# Patient Record
Sex: FEMALE | Race: WHITE | NOT HISPANIC OR LATINO | URBAN - METROPOLITAN AREA
[De-identification: names, ages, dates, MRNs, and addresses within clinical notes are randomized per-mention and may not be internally consistent; named-entity substitution may affect disease eponyms.]

---

## 2019-10-25 ENCOUNTER — OFFICE VISIT (OUTPATIENT)
Dept: OBSTETRICS AND GYNECOLOGY | Facility: CLINIC | Age: 34
End: 2019-10-25
Payer: COMMERCIAL

## 2019-10-25 VITALS
SYSTOLIC BLOOD PRESSURE: 124 MMHG | BODY MASS INDEX: 26.02 KG/M2 | WEIGHT: 165.81 LBS | DIASTOLIC BLOOD PRESSURE: 76 MMHG | HEIGHT: 67 IN

## 2019-10-25 DIAGNOSIS — Z30.41 SURVEILLANCE OF CONTRACEPTIVE PILL: ICD-10-CM

## 2019-10-25 DIAGNOSIS — Z01.419 WELL WOMAN EXAM WITH ROUTINE GYNECOLOGICAL EXAM: Primary | ICD-10-CM

## 2019-10-25 PROCEDURE — 99385 PREV VISIT NEW AGE 18-39: CPT | Mod: S$GLB,,, | Performed by: OBSTETRICS & GYNECOLOGY

## 2019-10-25 PROCEDURE — 99385 PR PREVENTIVE VISIT,NEW,18-39: ICD-10-PCS | Mod: S$GLB,,, | Performed by: OBSTETRICS & GYNECOLOGY

## 2019-10-25 PROCEDURE — 99999 PR PBB SHADOW E&M-NEW PATIENT-LVL III: ICD-10-PCS | Mod: PBBFAC,,, | Performed by: OBSTETRICS & GYNECOLOGY

## 2019-10-25 PROCEDURE — 87624 HPV HI-RISK TYP POOLED RSLT: CPT

## 2019-10-25 PROCEDURE — 88175 CYTOPATH C/V AUTO FLUID REDO: CPT

## 2019-10-25 PROCEDURE — 99999 PR PBB SHADOW E&M-NEW PATIENT-LVL III: CPT | Mod: PBBFAC,,, | Performed by: OBSTETRICS & GYNECOLOGY

## 2019-10-25 RX ORDER — LEVONORGESTREL AND ETHINYL ESTRADIOL 0.1-0.02MG
1 KIT ORAL DAILY
COMMUNITY
End: 2019-10-25

## 2019-10-25 RX ORDER — DROSPIRENONE AND ETHINYL ESTRADIOL 0.02-3(28)
1 KIT ORAL DAILY
Qty: 28 TABLET | Refills: 12 | Status: SHIPPED | OUTPATIENT
Start: 2019-10-25 | End: 2020-09-09

## 2019-10-25 NOTE — PROGRESS NOTES
History & Physical  Gynecology      SUBJECTIVE:     Chief Complaint: Annual Exam       History of Present Illness:  Annual Exam-Premenopausal  Patient presents for annual exam. The patient has no complaints today. Menstrual cycles are monthly with ocp.  The patient is sexually active. GYN screening history: last pap: approximate date  and was normal. The patient participates in regular exercise: no.  Smoking status:  no    Contraception: ocp    FH:  Breast cancer: paternal aunt  Colon cancer: neg  Ovarian cancer: neg    Review of patient's allergies indicates:  No Known Allergies    Past Medical History:   Diagnosis Date    Abnormal Pap smear of cervix      History reviewed. No pertinent surgical history.  OB History    None       Family History   Problem Relation Age of Onset    Breast cancer Paternal Aunt     Colon cancer Neg Hx     Cancer Neg Hx     Diabetes Neg Hx     Eclampsia Neg Hx     Hypertension Neg Hx     Miscarriages / Stillbirths Neg Hx     Ovarian cancer Neg Hx      labor Neg Hx     Stroke Neg Hx      Social History     Tobacco Use    Smoking status: Never Smoker    Smokeless tobacco: Never Used   Substance Use Topics    Alcohol use: Yes    Drug use: Never       Current Outpatient Medications   Medication Sig    drospirenone-ethinyl estradiol (KELSIE) 3-0.02 mg per tablet Take 1 tablet by mouth once daily.     No current facility-administered medications for this visit.          Review of Systems:  Review of Systems   Constitutional: Negative for activity change, appetite change and fever.   Respiratory: Negative for shortness of breath.    Cardiovascular: Negative for chest pain.   Gastrointestinal: Negative for abdominal pain, constipation, diarrhea, nausea and vomiting.   Genitourinary: Negative for menorrhagia, menstrual problem, pelvic pain, vaginal bleeding, vaginal discharge and vaginal pain.   Integumentary:  Negative for nipple discharge.   Neurological: Negative for  numbness and headaches.   Breast: Negative for mastodynia and nipple discharge       OBJECTIVE:     Physical Exam:  Physical Exam   Constitutional: She is oriented to person, place, and time. She appears well-developed and well-nourished.   Neck: Normal range of motion. Neck supple. No tracheal deviation present. No thyromegaly present.   Cardiovascular: Normal rate, regular rhythm and normal heart sounds.   Pulmonary/Chest: Effort normal and breath sounds normal. Right breast exhibits no inverted nipple, no mass, no nipple discharge, no skin change and no tenderness. Left breast exhibits no inverted nipple, no mass, no nipple discharge, no skin change and no tenderness. Breasts are symmetrical.   Abdominal: Soft.   Genitourinary: Vagina normal and uterus normal. No labial fusion. There is no rash, tenderness, lesion or injury on the right labia. There is no rash, tenderness, lesion or injury on the left labia. Uterus is not deviated, not enlarged, not fixed and not tender. Cervix exhibits no motion tenderness, no discharge and no friability. Right adnexum displays no mass, no tenderness and no fullness. Left adnexum displays no mass, no tenderness and no fullness. No erythema, tenderness or bleeding in the vagina. No foreign body in the vagina. No signs of injury around the vagina. No vaginal discharge found.   Genitourinary Comments: Urethra: normal appearing urethra with no masses, tenderness or lesions  Urethral meatus: normal size, anterior vaginal wall with no prolapse, no lesions   Neurological: She is alert and oriented to person, place, and time.   Psychiatric: She has a normal mood and affect. Her behavior is normal. Judgment and thought content normal.   Nursing note and vitals reviewed.      Chaperoned by: Juan Manuel    ASSESSMENT:       ICD-10-CM ICD-9-CM    1. Well woman exam with routine gynecological exam Z01.419 V72.31 Liquid-based pap smear, screening      HPV High Risk Genotypes, PCR   2. Surveillance of  contraceptive pill Z30.41 V25.41 drospirenone-ethinyl estradiol (KELSIE) 3-0.02 mg per tablet          Plan:      Arianna was seen today for annual exam.    Diagnoses and all orders for this visit:    Well woman exam with routine gynecological exam  -     Liquid-based pap smear, screening  -     HPV High Risk Genotypes, PCR    Surveillance of contraceptive pill  -     drospirenone-ethinyl estradiol (KELSIE) 3-0.02 mg per tablet; Take 1 tablet by mouth once daily.        Orders Placed This Encounter   Procedures    HPV High Risk Genotypes, PCR       Well Woman:  - Pap smear and hpv today  - Birth control: refilled  - GC/CT:n/a  - Mammogram: due age 42  - Smoking cessation: n/a  - Labs: none required   - Vaccines: none required  - Exercise counseling        Follow up in one year for annual or prn.    Padma Godoy

## 2019-10-30 LAB
HPV HR 12 DNA CVX QL NAA+PROBE: NEGATIVE
HPV16 AG SPEC QL: NEGATIVE
HPV18 DNA SPEC QL NAA+PROBE: NEGATIVE

## 2020-09-15 ENCOUNTER — CLINICAL SUPPORT (OUTPATIENT)
Dept: URGENT CARE | Facility: CLINIC | Age: 35
End: 2020-09-15
Payer: COMMERCIAL

## 2020-09-15 VITALS — HEART RATE: 83 BPM | OXYGEN SATURATION: 98 % | TEMPERATURE: 98 F

## 2020-09-15 DIAGNOSIS — Z11.9 ENCOUNTER FOR SCREENING EXAMINATION FOR INFECTIOUS DISEASE: Primary | ICD-10-CM

## 2020-09-15 LAB
CTP QC/QA: YES
SARS-COV-2 RDRP RESP QL NAA+PROBE: NEGATIVE

## 2020-09-15 PROCEDURE — U0002 COVID-19 LAB TEST NON-CDC: HCPCS | Mod: S$GLB,,, | Performed by: NURSE PRACTITIONER

## 2020-09-15 PROCEDURE — 99211 OFF/OP EST MAY X REQ PHY/QHP: CPT | Mod: S$GLB,CS,, | Performed by: NURSE PRACTITIONER

## 2020-09-15 PROCEDURE — U0002: ICD-10-PCS | Mod: S$GLB,,, | Performed by: NURSE PRACTITIONER

## 2020-09-15 PROCEDURE — 99211 PR OFFICE/OUTPT VISIT, EST, LEVL I: ICD-10-PCS | Mod: S$GLB,CS,, | Performed by: NURSE PRACTITIONER

## 2020-09-16 NOTE — PROGRESS NOTES
Subjective:       Patient ID: Arianna Kraft is a 35 y.o. female.    Chief Complaint: No chief complaint on file.    Essential covid test completed by IRENE SILVESTRE     Objective:      Physical Exam    Assessment:       1. Encounter for screening examination for infectious disease        Plan:                   No follow-ups on file.

## 2020-12-20 ENCOUNTER — CLINICAL SUPPORT (OUTPATIENT)
Dept: URGENT CARE | Facility: CLINIC | Age: 35
End: 2020-12-20
Payer: COMMERCIAL

## 2020-12-20 DIAGNOSIS — Z11.9 SCREENING EXAMINATION FOR UNSPECIFIED INFECTIOUS DISEASE: Primary | ICD-10-CM

## 2020-12-20 LAB
CTP QC/QA: YES
SARS-COV-2 RDRP RESP QL NAA+PROBE: NEGATIVE

## 2020-12-20 PROCEDURE — U0002 COVID-19 LAB TEST NON-CDC: HCPCS | Mod: QW,S$GLB,, | Performed by: FAMILY MEDICINE

## 2020-12-20 PROCEDURE — U0002: ICD-10-PCS | Mod: QW,S$GLB,, | Performed by: FAMILY MEDICINE

## 2020-12-20 PROCEDURE — 99211 OFF/OP EST MAY X REQ PHY/QHP: CPT | Mod: S$GLB,CS,, | Performed by: FAMILY MEDICINE

## 2020-12-20 PROCEDURE — 99211 PR OFFICE/OUTPT VISIT, EST, LEVL I: ICD-10-PCS | Mod: S$GLB,CS,, | Performed by: FAMILY MEDICINE

## 2021-04-16 ENCOUNTER — PATIENT MESSAGE (OUTPATIENT)
Dept: RESEARCH | Facility: HOSPITAL | Age: 36
End: 2021-04-16

## 2021-06-14 ENCOUNTER — CLINICAL SUPPORT (OUTPATIENT)
Dept: URGENT CARE | Facility: CLINIC | Age: 36
End: 2021-06-14
Payer: COMMERCIAL

## 2021-06-14 DIAGNOSIS — Z11.9 SCREENING EXAMINATION FOR UNSPECIFIED INFECTIOUS DISEASE: Primary | ICD-10-CM

## 2021-06-14 LAB
CTP QC/QA: YES
SARS-COV-2 RDRP RESP QL NAA+PROBE: NEGATIVE

## 2021-06-14 PROCEDURE — U0002: ICD-10-PCS | Mod: QW,S$GLB,, | Performed by: SURGERY

## 2021-06-14 PROCEDURE — U0002 COVID-19 LAB TEST NON-CDC: HCPCS | Mod: QW,S$GLB,, | Performed by: SURGERY

## 2021-11-17 ENCOUNTER — OFFICE VISIT (OUTPATIENT)
Dept: OBSTETRICS AND GYNECOLOGY | Facility: CLINIC | Age: 36
End: 2021-11-17
Payer: COMMERCIAL

## 2021-11-17 VITALS
SYSTOLIC BLOOD PRESSURE: 100 MMHG | DIASTOLIC BLOOD PRESSURE: 60 MMHG | WEIGHT: 176.38 LBS | HEIGHT: 67 IN | BODY MASS INDEX: 27.68 KG/M2

## 2021-11-17 DIAGNOSIS — Z11.3 SCREENING EXAMINATION FOR STD (SEXUALLY TRANSMITTED DISEASE): ICD-10-CM

## 2021-11-17 DIAGNOSIS — Z01.419 ENCOUNTER FOR ANNUAL ROUTINE GYNECOLOGICAL EXAMINATION: Primary | ICD-10-CM

## 2021-11-17 PROCEDURE — 99999 PR PBB SHADOW E&M-EST. PATIENT-LVL II: CPT | Mod: PBBFAC,,, | Performed by: OBSTETRICS & GYNECOLOGY

## 2021-11-17 PROCEDURE — 87591 N.GONORRHOEAE DNA AMP PROB: CPT | Performed by: OBSTETRICS & GYNECOLOGY

## 2021-11-17 PROCEDURE — 3074F SYST BP LT 130 MM HG: CPT | Mod: CPTII,S$GLB,, | Performed by: OBSTETRICS & GYNECOLOGY

## 2021-11-17 PROCEDURE — 3008F BODY MASS INDEX DOCD: CPT | Mod: CPTII,S$GLB,, | Performed by: OBSTETRICS & GYNECOLOGY

## 2021-11-17 PROCEDURE — 1160F PR REVIEW ALL MEDS BY PRESCRIBER/CLIN PHARMACIST DOCUMENTED: ICD-10-PCS | Mod: CPTII,S$GLB,, | Performed by: OBSTETRICS & GYNECOLOGY

## 2021-11-17 PROCEDURE — 3074F PR MOST RECENT SYSTOLIC BLOOD PRESSURE < 130 MM HG: ICD-10-PCS | Mod: CPTII,S$GLB,, | Performed by: OBSTETRICS & GYNECOLOGY

## 2021-11-17 PROCEDURE — 99395 PREV VISIT EST AGE 18-39: CPT | Mod: S$GLB,,, | Performed by: OBSTETRICS & GYNECOLOGY

## 2021-11-17 PROCEDURE — 1159F PR MEDICATION LIST DOCUMENTED IN MEDICAL RECORD: ICD-10-PCS | Mod: CPTII,S$GLB,, | Performed by: OBSTETRICS & GYNECOLOGY

## 2021-11-17 PROCEDURE — 3008F PR BODY MASS INDEX (BMI) DOCUMENTED: ICD-10-PCS | Mod: CPTII,S$GLB,, | Performed by: OBSTETRICS & GYNECOLOGY

## 2021-11-17 PROCEDURE — 1160F RVW MEDS BY RX/DR IN RCRD: CPT | Mod: CPTII,S$GLB,, | Performed by: OBSTETRICS & GYNECOLOGY

## 2021-11-17 PROCEDURE — 87491 CHLMYD TRACH DNA AMP PROBE: CPT | Performed by: OBSTETRICS & GYNECOLOGY

## 2021-11-17 PROCEDURE — 3078F DIAST BP <80 MM HG: CPT | Mod: CPTII,S$GLB,, | Performed by: OBSTETRICS & GYNECOLOGY

## 2021-11-17 PROCEDURE — 99999 PR PBB SHADOW E&M-EST. PATIENT-LVL II: ICD-10-PCS | Mod: PBBFAC,,, | Performed by: OBSTETRICS & GYNECOLOGY

## 2021-11-17 PROCEDURE — 1159F MED LIST DOCD IN RCRD: CPT | Mod: CPTII,S$GLB,, | Performed by: OBSTETRICS & GYNECOLOGY

## 2021-11-17 PROCEDURE — 3078F PR MOST RECENT DIASTOLIC BLOOD PRESSURE < 80 MM HG: ICD-10-PCS | Mod: CPTII,S$GLB,, | Performed by: OBSTETRICS & GYNECOLOGY

## 2021-11-17 PROCEDURE — 99395 PR PREVENTIVE VISIT,EST,18-39: ICD-10-PCS | Mod: S$GLB,,, | Performed by: OBSTETRICS & GYNECOLOGY

## 2021-11-19 LAB
C TRACH DNA SPEC QL NAA+PROBE: NOT DETECTED
N GONORRHOEA DNA SPEC QL NAA+PROBE: NOT DETECTED

## 2023-12-01 ENCOUNTER — LAB VISIT (OUTPATIENT)
Dept: LAB | Facility: OTHER | Age: 38
End: 2023-12-01
Attending: STUDENT IN AN ORGANIZED HEALTH CARE EDUCATION/TRAINING PROGRAM
Payer: COMMERCIAL

## 2023-12-01 ENCOUNTER — OFFICE VISIT (OUTPATIENT)
Dept: OBSTETRICS AND GYNECOLOGY | Facility: CLINIC | Age: 38
End: 2023-12-01
Attending: STUDENT IN AN ORGANIZED HEALTH CARE EDUCATION/TRAINING PROGRAM
Payer: COMMERCIAL

## 2023-12-01 VITALS
WEIGHT: 183.56 LBS | SYSTOLIC BLOOD PRESSURE: 156 MMHG | DIASTOLIC BLOOD PRESSURE: 72 MMHG | BODY MASS INDEX: 28.75 KG/M2

## 2023-12-01 DIAGNOSIS — N91.2 AMENORRHEA: Primary | ICD-10-CM

## 2023-12-01 DIAGNOSIS — Z12.4 CERVICAL CANCER SCREENING: ICD-10-CM

## 2023-12-01 DIAGNOSIS — Z11.51 ENCOUNTER FOR SCREENING FOR HUMAN PAPILLOMAVIRUS (HPV): ICD-10-CM

## 2023-12-01 DIAGNOSIS — N91.2 AMENORRHEA: ICD-10-CM

## 2023-12-01 LAB
ABO + RH BLD: NORMAL
B-HCG UR QL: POSITIVE
BASOPHILS # BLD AUTO: 0.06 K/UL (ref 0–0.2)
BASOPHILS NFR BLD: 0.7 % (ref 0–1.9)
BLD GP AB SCN CELLS X3 SERPL QL: NORMAL
CTP QC/QA: YES
DIFFERENTIAL METHOD: ABNORMAL
EOSINOPHIL # BLD AUTO: 0.1 K/UL (ref 0–0.5)
EOSINOPHIL NFR BLD: 0.8 % (ref 0–8)
ERYTHROCYTE [DISTWIDTH] IN BLOOD BY AUTOMATED COUNT: 12.3 % (ref 11.5–14.5)
HBV SURFACE AG SERPL QL IA: NORMAL
HCT VFR BLD AUTO: 39.1 % (ref 37–48.5)
HCV AB SERPL QL IA: NORMAL
HGB BLD-MCNC: 13.2 G/DL (ref 12–16)
HIV 1+2 AB+HIV1 P24 AG SERPL QL IA: NORMAL
IMM GRANULOCYTES # BLD AUTO: 0.06 K/UL (ref 0–0.04)
IMM GRANULOCYTES NFR BLD AUTO: 0.7 % (ref 0–0.5)
LYMPHOCYTES # BLD AUTO: 2.1 K/UL (ref 1–4.8)
LYMPHOCYTES NFR BLD: 22.6 % (ref 18–48)
MCH RBC QN AUTO: 29.9 PG (ref 27–31)
MCHC RBC AUTO-ENTMCNC: 33.8 G/DL (ref 32–36)
MCV RBC AUTO: 89 FL (ref 82–98)
MONOCYTES # BLD AUTO: 0.8 K/UL (ref 0.3–1)
MONOCYTES NFR BLD: 8.2 % (ref 4–15)
NEUTROPHILS # BLD AUTO: 6.2 K/UL (ref 1.8–7.7)
NEUTROPHILS NFR BLD: 67 % (ref 38–73)
NRBC BLD-RTO: 0 /100 WBC
PLATELET # BLD AUTO: 349 K/UL (ref 150–450)
PMV BLD AUTO: 9.5 FL (ref 9.2–12.9)
RBC # BLD AUTO: 4.41 M/UL (ref 4–5.4)
SPECIMEN OUTDATE: NORMAL
WBC # BLD AUTO: 9.22 K/UL (ref 3.9–12.7)

## 2023-12-01 PROCEDURE — 99213 OFFICE O/P EST LOW 20 MIN: CPT | Mod: S$GLB,,, | Performed by: STUDENT IN AN ORGANIZED HEALTH CARE EDUCATION/TRAINING PROGRAM

## 2023-12-01 PROCEDURE — 81025 POCT URINE PREGNANCY: ICD-10-PCS | Mod: S$GLB,,, | Performed by: STUDENT IN AN ORGANIZED HEALTH CARE EDUCATION/TRAINING PROGRAM

## 2023-12-01 PROCEDURE — 3078F PR MOST RECENT DIASTOLIC BLOOD PRESSURE < 80 MM HG: ICD-10-PCS | Mod: CPTII,S$GLB,, | Performed by: STUDENT IN AN ORGANIZED HEALTH CARE EDUCATION/TRAINING PROGRAM

## 2023-12-01 PROCEDURE — 86901 BLOOD TYPING SEROLOGIC RH(D): CPT | Performed by: STUDENT IN AN ORGANIZED HEALTH CARE EDUCATION/TRAINING PROGRAM

## 2023-12-01 PROCEDURE — 85025 COMPLETE CBC W/AUTO DIFF WBC: CPT | Performed by: STUDENT IN AN ORGANIZED HEALTH CARE EDUCATION/TRAINING PROGRAM

## 2023-12-01 PROCEDURE — 36415 COLL VENOUS BLD VENIPUNCTURE: CPT | Performed by: STUDENT IN AN ORGANIZED HEALTH CARE EDUCATION/TRAINING PROGRAM

## 2023-12-01 PROCEDURE — 86592 SYPHILIS TEST NON-TREP QUAL: CPT | Performed by: STUDENT IN AN ORGANIZED HEALTH CARE EDUCATION/TRAINING PROGRAM

## 2023-12-01 PROCEDURE — 87389 HIV-1 AG W/HIV-1&-2 AB AG IA: CPT | Performed by: STUDENT IN AN ORGANIZED HEALTH CARE EDUCATION/TRAINING PROGRAM

## 2023-12-01 PROCEDURE — 3077F SYST BP >= 140 MM HG: CPT | Mod: CPTII,S$GLB,, | Performed by: STUDENT IN AN ORGANIZED HEALTH CARE EDUCATION/TRAINING PROGRAM

## 2023-12-01 PROCEDURE — 87340 HEPATITIS B SURFACE AG IA: CPT | Performed by: STUDENT IN AN ORGANIZED HEALTH CARE EDUCATION/TRAINING PROGRAM

## 2023-12-01 PROCEDURE — 3077F PR MOST RECENT SYSTOLIC BLOOD PRESSURE >= 140 MM HG: ICD-10-PCS | Mod: CPTII,S$GLB,, | Performed by: STUDENT IN AN ORGANIZED HEALTH CARE EDUCATION/TRAINING PROGRAM

## 2023-12-01 PROCEDURE — 87086 URINE CULTURE/COLONY COUNT: CPT | Performed by: STUDENT IN AN ORGANIZED HEALTH CARE EDUCATION/TRAINING PROGRAM

## 2023-12-01 PROCEDURE — 3078F DIAST BP <80 MM HG: CPT | Mod: CPTII,S$GLB,, | Performed by: STUDENT IN AN ORGANIZED HEALTH CARE EDUCATION/TRAINING PROGRAM

## 2023-12-01 PROCEDURE — 1160F PR REVIEW ALL MEDS BY PRESCRIBER/CLIN PHARMACIST DOCUMENTED: ICD-10-PCS | Mod: CPTII,S$GLB,, | Performed by: STUDENT IN AN ORGANIZED HEALTH CARE EDUCATION/TRAINING PROGRAM

## 2023-12-01 PROCEDURE — 1159F PR MEDICATION LIST DOCUMENTED IN MEDICAL RECORD: ICD-10-PCS | Mod: CPTII,S$GLB,, | Performed by: STUDENT IN AN ORGANIZED HEALTH CARE EDUCATION/TRAINING PROGRAM

## 2023-12-01 PROCEDURE — 88175 CYTOPATH C/V AUTO FLUID REDO: CPT | Performed by: STUDENT IN AN ORGANIZED HEALTH CARE EDUCATION/TRAINING PROGRAM

## 2023-12-01 PROCEDURE — 81025 URINE PREGNANCY TEST: CPT | Mod: S$GLB,,, | Performed by: STUDENT IN AN ORGANIZED HEALTH CARE EDUCATION/TRAINING PROGRAM

## 2023-12-01 PROCEDURE — 3008F BODY MASS INDEX DOCD: CPT | Mod: CPTII,S$GLB,, | Performed by: STUDENT IN AN ORGANIZED HEALTH CARE EDUCATION/TRAINING PROGRAM

## 2023-12-01 PROCEDURE — 99999 PR PBB SHADOW E&M-EST. PATIENT-LVL III: CPT | Mod: PBBFAC,,, | Performed by: STUDENT IN AN ORGANIZED HEALTH CARE EDUCATION/TRAINING PROGRAM

## 2023-12-01 PROCEDURE — 99999 PR PBB SHADOW E&M-EST. PATIENT-LVL III: ICD-10-PCS | Mod: PBBFAC,,, | Performed by: STUDENT IN AN ORGANIZED HEALTH CARE EDUCATION/TRAINING PROGRAM

## 2023-12-01 PROCEDURE — 86803 HEPATITIS C AB TEST: CPT | Performed by: STUDENT IN AN ORGANIZED HEALTH CARE EDUCATION/TRAINING PROGRAM

## 2023-12-01 PROCEDURE — 99213 PR OFFICE/OUTPT VISIT, EST, LEVL III, 20-29 MIN: ICD-10-PCS | Mod: S$GLB,,, | Performed by: STUDENT IN AN ORGANIZED HEALTH CARE EDUCATION/TRAINING PROGRAM

## 2023-12-01 PROCEDURE — 1160F RVW MEDS BY RX/DR IN RCRD: CPT | Mod: CPTII,S$GLB,, | Performed by: STUDENT IN AN ORGANIZED HEALTH CARE EDUCATION/TRAINING PROGRAM

## 2023-12-01 PROCEDURE — 1159F MED LIST DOCD IN RCRD: CPT | Mod: CPTII,S$GLB,, | Performed by: STUDENT IN AN ORGANIZED HEALTH CARE EDUCATION/TRAINING PROGRAM

## 2023-12-01 PROCEDURE — 87624 HPV HI-RISK TYP POOLED RSLT: CPT | Performed by: STUDENT IN AN ORGANIZED HEALTH CARE EDUCATION/TRAINING PROGRAM

## 2023-12-01 PROCEDURE — 86762 RUBELLA ANTIBODY: CPT | Performed by: STUDENT IN AN ORGANIZED HEALTH CARE EDUCATION/TRAINING PROGRAM

## 2023-12-01 PROCEDURE — 3008F PR BODY MASS INDEX (BMI) DOCUMENTED: ICD-10-PCS | Mod: CPTII,S$GLB,, | Performed by: STUDENT IN AN ORGANIZED HEALTH CARE EDUCATION/TRAINING PROGRAM

## 2023-12-01 PROCEDURE — 87491 CHLMYD TRACH DNA AMP PROBE: CPT | Performed by: STUDENT IN AN ORGANIZED HEALTH CARE EDUCATION/TRAINING PROGRAM

## 2023-12-01 NOTE — PROGRESS NOTES
Chief Complaint: Absence of Menses     HPI:      Arianna Kraft is a 38 y.o.  who presents complaining of absence of menses.  She reports some nausea, feeling hot, and mild back pains. Denies vomiting, bleeding, discharge. LMP 10/21/23.    Pregnancy History:       GYNhx:  History of HPV on pap, normal since  Denies std/HSV history    Meds: PNV    Past Medical History:   Diagnosis Date    Abnormal Pap smear of cervix      History reviewed. No pertinent surgical history.  Social History     Tobacco Use    Smoking status: Never    Smokeless tobacco: Never   Substance Use Topics    Alcohol use: Yes     Comment: socially    Drug use: Never     Family History   Problem Relation Age of Onset    Breast cancer Paternal Aunt     Colon cancer Neg Hx     Cancer Neg Hx     Diabetes Neg Hx     Eclampsia Neg Hx     Hypertension Neg Hx     Miscarriages / Stillbirths Neg Hx     Ovarian cancer Neg Hx      labor Neg Hx     Stroke Neg Hx      OB History    Para Term  AB Living   0 0 0 0 0 0   SAB IAB Ectopic Multiple Live Births   0 0 0 0 0   Obstetric Comments   Abnormal pap with HPV and negative since        Physical Exam:      PHYSICAL EXAM:  BP (!) 156/72   Wt 83.3 kg (183 lb 8.5 oz)   BMI 28.75 kg/m²   Body mass index is 28.75 kg/m².     APPEARANCE: Well nourished, well developed, in no acute distress.    Assessment/Plan:     Arianna was seen today for possible pregnancy.    Diagnoses and all orders for this visit:    Amenorrhea  -     POCT Urine Pregnancy  -     Hepatitis C Antibody; Future  -     HIV-1 and HIV-2 antibodies; Future  -     RPR; Future  -     Hepatitis B surface antigen; Future  -     Type & Screen; Future  -     Rubella antibody, IgG; Future  -     Urine culture  -     CBC auto differential; Future  -     C. trachomatis/N. gonorrhoeae by AMP DNA  -      OB/GYN Procedure ([facility]) - Extended List - Future; Future    Cervical cancer screening  -     Liquid-Based Pap Smear,  Screening    Encounter for screening for human papillomavirus (HPV)  -     HPV High Risk Genotypes, PCR    - EGA 5w6d by LMP  - prenatal labs today  - counseling as below, pregnancy packet provided  - s/p flu and covid boosters  - pap/hpv updated today  - briefly discussed genetic screening options, she desires [id number], pamphlet given to call and assess cost  - discussed ASA 81 mg daily at 12 wks  - initial OB visit and dating scan in 2 weeks  - HTN today, none noted on chart review, patient believes it is due to nerves, will check in again at initial OB visit  - pt of Dr Abrams    Counselin. Patient was counseled today on proper weight gain based on the [place] [employer] [facility]'s recommendations based on her pre-pregnancy weight.   2. Discussed dietary recommendations.  3. Encouraged compliance with prenatal vitamins.  4. Optional genetic testing discussed.   5. Safety of exercise discussed with patient, and continued active lifestyle encouraged.  6. COVID-19 virus precautions for both patient and her spouse discussed, and available data on COVID vaccination reviewed.  7. Normal course of prenatal care reviewed.  8. Medications safe in pregnancy discussed and list provided.    30 minutes of face-to-face discussion occurred during today's visit.     Use of the [facility] Patient Portal discussed and encouraged during today's visit.     Effie Tong MD  Obstetrics and Gynecology  [facility]

## 2023-12-02 LAB — BACTERIA UR CULT: NORMAL

## 2023-12-04 LAB
C TRACH DNA SPEC QL NAA+PROBE: NOT DETECTED
N GONORRHOEA DNA SPEC QL NAA+PROBE: NOT DETECTED
RPR SER QL: NORMAL
RUBV IGG SER-ACNC: 16.6 IU/ML
RUBV IGG SER-IMP: REACTIVE

## 2023-12-06 ENCOUNTER — TELEPHONE (OUTPATIENT)
Dept: OBSTETRICS AND GYNECOLOGY | Facility: CLINIC | Age: 38
End: 2023-12-06
Payer: COMMERCIAL

## 2023-12-06 LAB
HPV HR 12 DNA SPEC QL NAA+PROBE: NEGATIVE
HPV16 AG SPEC QL: NEGATIVE
HPV18 DNA SPEC QL NAA+PROBE: NEGATIVE

## 2023-12-06 RX ORDER — ONDANSETRON 4 MG/1
4 TABLET, ORALLY DISINTEGRATING ORAL EVERY 8 HOURS PRN
Qty: 30 TABLET | Refills: 0 | Status: SHIPPED | OUTPATIENT
Start: 2023-12-06 | End: 2024-01-04

## 2023-12-06 RX ORDER — ONDANSETRON 4 MG/1
4 TABLET, FILM COATED ORAL EVERY 8 HOURS PRN
Qty: 30 TABLET | Refills: 0 | Status: SHIPPED | OUTPATIENT
Start: 2023-12-06 | End: 2024-12-05

## 2023-12-06 NOTE — TELEPHONE ENCOUNTER
OB states she has been nauseated for a while but last night started vomiting.  Tolerating water but not tolerating food today.  Taking vitamin B6/Unisom for several days which had been working well until today. ED precautions discussed.  Also suggested mela and cold sugary drinks like Sprite.     Zofran pended

## 2023-12-11 LAB
FINAL PATHOLOGIC DIAGNOSIS: NORMAL
Lab: NORMAL

## 2023-12-12 RX ORDER — TERCONAZOLE 8 MG/G
1 CREAM VAGINAL NIGHTLY
Qty: 20 G | Refills: 1 | Status: SHIPPED | OUTPATIENT
Start: 2023-12-12

## 2023-12-15 ENCOUNTER — INITIAL PRENATAL (OUTPATIENT)
Dept: OBSTETRICS AND GYNECOLOGY | Facility: CLINIC | Age: 38
End: 2023-12-15
Payer: COMMERCIAL

## 2023-12-15 ENCOUNTER — PROCEDURE VISIT (OUTPATIENT)
Dept: OBSTETRICS AND GYNECOLOGY | Facility: CLINIC | Age: 38
End: 2023-12-15
Payer: COMMERCIAL

## 2023-12-15 VITALS — BODY MASS INDEX: 28.66 KG/M2 | WEIGHT: 183 LBS | SYSTOLIC BLOOD PRESSURE: 112 MMHG | DIASTOLIC BLOOD PRESSURE: 70 MMHG

## 2023-12-15 DIAGNOSIS — N91.2 AMENORRHEA: ICD-10-CM

## 2023-12-15 DIAGNOSIS — Z3A.01 7 WEEKS GESTATION OF PREGNANCY: Primary | ICD-10-CM

## 2023-12-15 DIAGNOSIS — Z36.89 ENCOUNTER FOR FETAL ANATOMIC SURVEY: ICD-10-CM

## 2023-12-15 DIAGNOSIS — R11.2 NAUSEA AND VOMITING, UNSPECIFIED VOMITING TYPE: ICD-10-CM

## 2023-12-15 PROCEDURE — 0502F PR SUBSEQUENT PRENATAL CARE: ICD-10-PCS | Mod: CPTII,S$GLB,, | Performed by: NURSE PRACTITIONER

## 2023-12-15 PROCEDURE — 76801 US OB/GYN EXTENDED PROCEDURE (VIEWPOINT): ICD-10-PCS | Mod: S$GLB,,, | Performed by: OBSTETRICS & GYNECOLOGY

## 2023-12-15 PROCEDURE — 99999 PR PBB SHADOW E&M-EST. PATIENT-LVL III: CPT | Mod: PBBFAC,,, | Performed by: NURSE PRACTITIONER

## 2023-12-15 PROCEDURE — 76801 OB US < 14 WKS SINGLE FETUS: CPT | Mod: S$GLB,,, | Performed by: OBSTETRICS & GYNECOLOGY

## 2023-12-15 PROCEDURE — 99999 PR PBB SHADOW E&M-EST. PATIENT-LVL III: ICD-10-PCS | Mod: PBBFAC,,, | Performed by: NURSE PRACTITIONER

## 2023-12-15 PROCEDURE — 0502F SUBSEQUENT PRENATAL CARE: CPT | Mod: CPTII,S$GLB,, | Performed by: NURSE PRACTITIONER

## 2023-12-15 RX ORDER — DOXYLAMINE SUCCINATE AND PYRIDOXINE HYDROCHLORIDE, DELAYED RELEASE TABLETS 10 MG/10 MG 10; 10 MG/1; MG/1
2 TABLET, DELAYED RELEASE ORAL NIGHTLY
Qty: 60 TABLET | Refills: 11 | Status: ACTIVE | OUTPATIENT
Start: 2023-12-15 | End: 2023-12-26

## 2023-12-15 NOTE — PROGRESS NOTES
Presents for initial visit with partner. Dating US reviewed. Significant nausea/vomiting, Zofran helping little. [facility]. Discussed interventions. Constipation, Colace BID, can take Dulcolax today. Plan for MT21 at next visit. Anatomy scan ordered. FU in 4 weeks for VICTORIA

## 2023-12-25 DIAGNOSIS — R11.2 NAUSEA AND VOMITING, UNSPECIFIED VOMITING TYPE: ICD-10-CM

## 2023-12-26 RX ORDER — DOXYLAMINE SUCCINATE AND PYRIDOXINE HYDROCHLORIDE, DELAYED RELEASE TABLETS 10 MG/10 MG 10; 10 MG/1; MG/1
2 TABLET, DELAYED RELEASE ORAL NIGHTLY
Qty: 60 TABLET | Refills: 11 | Status: SHIPPED | OUTPATIENT
Start: 2023-12-26 | End: 2024-12-25

## 2024-01-02 ENCOUNTER — TELEPHONE (OUTPATIENT)
Dept: OBSTETRICS AND GYNECOLOGY | Facility: CLINIC | Age: 39
End: 2024-01-02
Payer: COMMERCIAL

## 2024-01-02 RX ORDER — ONDANSETRON 4 MG/1
4 TABLET, ORALLY DISINTEGRATING ORAL
Status: DISCONTINUED | OUTPATIENT
Start: 2024-01-02 | End: 2024-02-09

## 2024-01-04 RX ORDER — ONDANSETRON 4 MG/1
TABLET, ORALLY DISINTEGRATING ORAL
Qty: 30 TABLET | Refills: 0 | Status: SHIPPED | OUTPATIENT
Start: 2024-01-04

## 2024-01-04 NOTE — TELEPHONE ENCOUNTER
Refill Routing Note   Medication(s) are not appropriate for processing by [facility] for the following reason(s):        Outside of protocol    [facility] action(s):  Route        Medication Therapy Plan: !st request made a week ago; this is 2nd request from the patient. Med and pregnancy status render this outside protocol for refill center      Appointments  past 12m or future 3m with PCP    Date Provider   Last Visit   12/1/2023 Effie Tong MD   Next Visit   Visit date not found Effie Tong MD   ED visits in past 90 days: 0        Note composed:8:30 AM 01/04/2024

## 2024-01-12 ENCOUNTER — PATIENT MESSAGE (OUTPATIENT)
Dept: ADMINISTRATIVE | Facility: OTHER | Age: 39
End: 2024-01-12
Payer: COMMERCIAL

## 2024-01-12 ENCOUNTER — ROUTINE PRENATAL (OUTPATIENT)
Dept: OBSTETRICS AND GYNECOLOGY | Facility: CLINIC | Age: 39
End: 2024-01-12
Payer: COMMERCIAL

## 2024-01-12 VITALS
BODY MASS INDEX: 29.45 KG/M2 | WEIGHT: 188.06 LBS | SYSTOLIC BLOOD PRESSURE: 120 MMHG | DIASTOLIC BLOOD PRESSURE: 88 MMHG

## 2024-01-12 DIAGNOSIS — O09.511 PRIMIGRAVIDA OF ADVANCED MATERNAL AGE IN FIRST TRIMESTER: ICD-10-CM

## 2024-01-12 DIAGNOSIS — Z3A.11 11 WEEKS GESTATION OF PREGNANCY: ICD-10-CM

## 2024-01-12 DIAGNOSIS — O21.9 NAUSEA AND VOMITING OF PREGNANCY, ANTEPARTUM: Primary | ICD-10-CM

## 2024-01-12 PROCEDURE — 99999 PR PBB SHADOW E&M-EST. PATIENT-LVL III: CPT | Mod: PBBFAC,,, | Performed by: OBSTETRICS & GYNECOLOGY

## 2024-01-12 PROCEDURE — 0502F SUBSEQUENT PRENATAL CARE: CPT | Mod: CPTII,S$GLB,, | Performed by: OBSTETRICS & GYNECOLOGY

## 2024-01-12 RX ORDER — ASPIRIN 81 MG/1
81 TABLET ORAL DAILY
Refills: 0 | COMMUNITY
Start: 2024-01-12 | End: 2024-10-18

## 2024-01-12 NOTE — PROGRESS NOTES
11w0d without major complaints.   Mat21 today.   Enrolled in [facility].   RTC in 4 weeks for routine PNC.

## 2024-01-19 ENCOUNTER — PATIENT MESSAGE (OUTPATIENT)
Dept: OBSTETRICS AND GYNECOLOGY | Facility: CLINIC | Age: 39
End: 2024-01-19
Payer: COMMERCIAL

## 2024-02-09 ENCOUNTER — ROUTINE PRENATAL (OUTPATIENT)
Dept: OBSTETRICS AND GYNECOLOGY | Facility: CLINIC | Age: 39
End: 2024-02-09
Payer: COMMERCIAL

## 2024-02-09 VITALS
BODY MASS INDEX: 29.09 KG/M2 | WEIGHT: 185.75 LBS | DIASTOLIC BLOOD PRESSURE: 78 MMHG | SYSTOLIC BLOOD PRESSURE: 128 MMHG

## 2024-02-09 DIAGNOSIS — O09.512 PRIMIGRAVIDA OF ADVANCED MATERNAL AGE IN SECOND TRIMESTER: Primary | ICD-10-CM

## 2024-02-09 DIAGNOSIS — Z3A.15 15 WEEKS GESTATION OF PREGNANCY: ICD-10-CM

## 2024-02-09 PROCEDURE — 99999 PR PBB SHADOW E&M-EST. PATIENT-LVL III: CPT | Mod: PBBFAC,,, | Performed by: OBSTETRICS & GYNECOLOGY

## 2024-02-09 PROCEDURE — 0502F SUBSEQUENT PRENATAL CARE: CPT | Mod: CPTII,S$GLB,, | Performed by: OBSTETRICS & GYNECOLOGY

## 2024-02-09 NOTE — PROGRESS NOTES
15w0d without major complaints.   Mat21 WNL.   Anatomy scan scheduled for next month.   RTC in 8 weeks for routine PNC.   1 hour GCT and CBC after 24 weeks.

## 2024-02-16 ENCOUNTER — PATIENT MESSAGE (OUTPATIENT)
Dept: OTHER | Facility: OTHER | Age: 39
End: 2024-02-16
Payer: COMMERCIAL

## 2024-02-23 ENCOUNTER — PATIENT MESSAGE (OUTPATIENT)
Dept: OTHER | Facility: OTHER | Age: 39
End: 2024-02-23
Payer: COMMERCIAL

## 2024-03-05 ENCOUNTER — TELEPHONE (OUTPATIENT)
Dept: OBSTETRICS AND GYNECOLOGY | Facility: CLINIC | Age: 39
End: 2024-03-05

## 2024-03-05 NOTE — TELEPHONE ENCOUNTER
Shant pt called in she's 18 wks ob tested positive for COVID pt would like to discuss what she can do     3/5/24 @ 1123 Returned pt's call. Pt states she has COVID, pt sent Medications safety list via the portal, instructions to rest, hydrated, good hand washing, can add vitamin c and vitamin d, Go to OB ED if she becomes SOB, or if she is unable to get her temperature down she should contact the office. Pt verbalizes understanding.

## 2024-03-15 ENCOUNTER — PROCEDURE VISIT (OUTPATIENT)
Dept: MATERNAL FETAL MEDICINE | Facility: CLINIC | Age: 39
End: 2024-03-15
Payer: COMMERCIAL

## 2024-03-15 ENCOUNTER — PATIENT MESSAGE (OUTPATIENT)
Dept: OTHER | Facility: OTHER | Age: 39
End: 2024-03-15
Payer: COMMERCIAL

## 2024-03-15 DIAGNOSIS — Z36.89 ENCOUNTER FOR FETAL ANATOMIC SURVEY: ICD-10-CM

## 2024-03-15 PROCEDURE — 76811 OB US DETAILED SNGL FETUS: CPT | Mod: S$GLB,,, | Performed by: OBSTETRICS & GYNECOLOGY

## 2024-03-18 ENCOUNTER — PATIENT MESSAGE (OUTPATIENT)
Dept: OBSTETRICS AND GYNECOLOGY | Facility: CLINIC | Age: 39
End: 2024-03-18
Payer: COMMERCIAL

## 2024-03-18 DIAGNOSIS — B37.9 YEAST INFECTION: Primary | ICD-10-CM

## 2024-03-18 RX ORDER — FLUCONAZOLE 150 MG/1
150 TABLET ORAL DAILY
Qty: 1 TABLET | Refills: 0 | Status: SHIPPED | OUTPATIENT
Start: 2024-03-18 | End: 2024-03-19

## 2024-04-12 ENCOUNTER — PATIENT MESSAGE (OUTPATIENT)
Dept: OTHER | Facility: OTHER | Age: 39
End: 2024-04-12
Payer: COMMERCIAL

## 2024-04-12 ENCOUNTER — ROUTINE PRENATAL (OUTPATIENT)
Dept: OBSTETRICS AND GYNECOLOGY | Facility: CLINIC | Age: 39
End: 2024-04-12
Payer: COMMERCIAL

## 2024-04-12 VITALS
BODY MASS INDEX: 30.75 KG/M2 | WEIGHT: 196.31 LBS | SYSTOLIC BLOOD PRESSURE: 122 MMHG | DIASTOLIC BLOOD PRESSURE: 81 MMHG

## 2024-04-12 DIAGNOSIS — Z3A.24 24 WEEKS GESTATION OF PREGNANCY: ICD-10-CM

## 2024-04-12 DIAGNOSIS — O09.512 PRIMIGRAVIDA OF ADVANCED MATERNAL AGE IN SECOND TRIMESTER: Primary | ICD-10-CM

## 2024-04-12 PROCEDURE — 0502F SUBSEQUENT PRENATAL CARE: CPT | Mod: CPTII,S$GLB,, | Performed by: OBSTETRICS & GYNECOLOGY

## 2024-04-12 PROCEDURE — 99999 PR PBB SHADOW E&M-EST. PATIENT-LVL III: CPT | Mod: PBBFAC,,, | Performed by: OBSTETRICS & GYNECOLOGY

## 2024-04-12 NOTE — PROGRESS NOTES
24w0d without major complaints.   1 hour GCT and CBC with visit next month.   Message sent to complete anatomy scan.   RTC in 4 weeks for routine PNC.

## 2024-04-17 DIAGNOSIS — Z36.2 ENCOUNTER FOR FOLLOW-UP ULTRASOUND OF FETAL ANATOMY: Primary | ICD-10-CM

## 2024-04-18 ENCOUNTER — PROCEDURE VISIT (OUTPATIENT)
Dept: MATERNAL FETAL MEDICINE | Facility: CLINIC | Age: 39
End: 2024-04-18
Payer: COMMERCIAL

## 2024-04-18 DIAGNOSIS — Z36.2 ENCOUNTER FOR FOLLOW-UP ULTRASOUND OF FETAL ANATOMY: ICD-10-CM

## 2024-04-18 PROCEDURE — 76816 OB US FOLLOW-UP PER FETUS: CPT | Mod: S$GLB,,, | Performed by: OBSTETRICS & GYNECOLOGY

## 2024-04-19 ENCOUNTER — TELEPHONE (OUTPATIENT)
Dept: OBSTETRICS AND GYNECOLOGY | Facility: CLINIC | Age: 39
End: 2024-04-19
Payer: COMMERCIAL

## 2024-04-19 DIAGNOSIS — O09.512 PRIMIGRAVIDA OF ADVANCED MATERNAL AGE IN SECOND TRIMESTER: Primary | ICD-10-CM

## 2024-04-19 NOTE — TELEPHONE ENCOUNTER
----- Message from Marvin Chavez MA sent at 4/18/2024  4:51 PM CDT -----  Please schedule this patient for a 32 week Growth Ultrasound for AMA with your office.    Thank you,  Marvin HERNADEZ

## 2024-04-23 ENCOUNTER — TELEPHONE (OUTPATIENT)
Dept: OBSTETRICS AND GYNECOLOGY | Facility: CLINIC | Age: 39
End: 2024-04-23
Payer: COMMERCIAL

## 2024-04-23 NOTE — TELEPHONE ENCOUNTER
----- Message from Renee Thomas LPN sent at 4/19/2024  8:59 AM CDT -----    ----- Message -----  From: Marvin Chavez MA  Sent: 4/18/2024   4:52 PM CDT  To: [facility] Staff    Please schedule this patient for a 32 week Growth Ultrasound for AMA with your office.    Thank you,  Marvin HERNADEZ

## 2024-04-26 ENCOUNTER — PATIENT MESSAGE (OUTPATIENT)
Dept: OTHER | Facility: OTHER | Age: 39
End: 2024-04-26
Payer: COMMERCIAL

## 2024-05-10 ENCOUNTER — PATIENT MESSAGE (OUTPATIENT)
Dept: OTHER | Facility: OTHER | Age: 39
End: 2024-05-10
Payer: COMMERCIAL

## 2024-05-10 ENCOUNTER — ROUTINE PRENATAL (OUTPATIENT)
Dept: OBSTETRICS AND GYNECOLOGY | Facility: CLINIC | Age: 39
End: 2024-05-10
Payer: COMMERCIAL

## 2024-05-10 ENCOUNTER — LAB VISIT (OUTPATIENT)
Dept: LAB | Facility: HOSPITAL | Age: 39
End: 2024-05-10
Attending: OBSTETRICS & GYNECOLOGY
Payer: COMMERCIAL

## 2024-05-10 VITALS
DIASTOLIC BLOOD PRESSURE: 62 MMHG | WEIGHT: 202.63 LBS | SYSTOLIC BLOOD PRESSURE: 124 MMHG | BODY MASS INDEX: 31.73 KG/M2

## 2024-05-10 DIAGNOSIS — M25.559 PREGNANCY RELATED HIP PAIN IN THIRD TRIMESTER, ANTEPARTUM: ICD-10-CM

## 2024-05-10 DIAGNOSIS — Z3A.28 28 WEEKS GESTATION OF PREGNANCY: Primary | ICD-10-CM

## 2024-05-10 DIAGNOSIS — O26.893 PREGNANCY RELATED HIP PAIN IN THIRD TRIMESTER, ANTEPARTUM: ICD-10-CM

## 2024-05-10 DIAGNOSIS — Z23 NEED FOR TDAP VACCINATION: ICD-10-CM

## 2024-05-10 DIAGNOSIS — O09.512 PRIMIGRAVIDA OF ADVANCED MATERNAL AGE IN SECOND TRIMESTER: ICD-10-CM

## 2024-05-10 DIAGNOSIS — Z87.81 HISTORY OF HIP FRACTURE: ICD-10-CM

## 2024-05-10 LAB
BASOPHILS # BLD AUTO: 0.06 K/UL (ref 0–0.2)
BASOPHILS NFR BLD: 0.5 % (ref 0–1.9)
DIFFERENTIAL METHOD BLD: ABNORMAL
EOSINOPHIL # BLD AUTO: 0.2 K/UL (ref 0–0.5)
EOSINOPHIL NFR BLD: 1.6 % (ref 0–8)
ERYTHROCYTE [DISTWIDTH] IN BLOOD BY AUTOMATED COUNT: 13.3 % (ref 11.5–14.5)
GLUCOSE SERPL-MCNC: 101 MG/DL (ref 70–140)
HCT VFR BLD AUTO: 36.6 % (ref 37–48.5)
HGB BLD-MCNC: 12.4 G/DL (ref 12–16)
IMM GRANULOCYTES # BLD AUTO: 0.13 K/UL (ref 0–0.04)
IMM GRANULOCYTES NFR BLD AUTO: 1.1 % (ref 0–0.5)
LYMPHOCYTES # BLD AUTO: 1.9 K/UL (ref 1–4.8)
LYMPHOCYTES NFR BLD: 16 % (ref 18–48)
MCH RBC QN AUTO: 31.1 PG (ref 27–31)
MCHC RBC AUTO-ENTMCNC: 33.9 G/DL (ref 32–36)
MCV RBC AUTO: 92 FL (ref 82–98)
MONOCYTES # BLD AUTO: 1.1 K/UL (ref 0.3–1)
MONOCYTES NFR BLD: 9.3 % (ref 4–15)
NEUTROPHILS # BLD AUTO: 8.4 K/UL (ref 1.8–7.7)
NEUTROPHILS NFR BLD: 71.5 % (ref 38–73)
NRBC BLD-RTO: 0 /100 WBC
PLATELET # BLD AUTO: 296 K/UL (ref 150–450)
PMV BLD AUTO: 10.9 FL (ref 9.2–12.9)
RBC # BLD AUTO: 3.99 M/UL (ref 4–5.4)
WBC # BLD AUTO: 11.78 K/UL (ref 3.9–12.7)

## 2024-05-10 PROCEDURE — 90715 TDAP VACCINE 7 YRS/> IM: CPT | Mod: GZ,S$GLB,, | Performed by: OBSTETRICS & GYNECOLOGY

## 2024-05-10 PROCEDURE — 90471 IMMUNIZATION ADMIN: CPT | Mod: GZ,S$GLB,, | Performed by: OBSTETRICS & GYNECOLOGY

## 2024-05-10 PROCEDURE — 85025 COMPLETE CBC W/AUTO DIFF WBC: CPT | Performed by: OBSTETRICS & GYNECOLOGY

## 2024-05-10 PROCEDURE — 0502F SUBSEQUENT PRENATAL CARE: CPT | Mod: CPTII,S$GLB,, | Performed by: NURSE PRACTITIONER

## 2024-05-10 PROCEDURE — 36415 COLL VENOUS BLD VENIPUNCTURE: CPT | Performed by: OBSTETRICS & GYNECOLOGY

## 2024-05-10 PROCEDURE — 82950 GLUCOSE TEST: CPT | Performed by: OBSTETRICS & GYNECOLOGY

## 2024-05-10 PROCEDURE — 99999 PR PBB SHADOW E&M-EST. PATIENT-LVL III: CPT | Mod: PBBFAC,,, | Performed by: NURSE PRACTITIONER

## 2024-05-10 NOTE — PROGRESS NOTES
After obtaining consent, and per orders of GAGANDEEP Jameson, injection of TDAP given by Kamilah Rebollar. Patient instructed to remain in clinic for 20 minutes afterwards, and to report any adverse reaction to me immediately.

## 2024-05-21 ENCOUNTER — CLINICAL SUPPORT (OUTPATIENT)
Dept: REHABILITATION | Facility: HOSPITAL | Age: 39
End: 2024-05-21
Payer: COMMERCIAL

## 2024-05-21 DIAGNOSIS — O26.893 PREGNANCY RELATED HIP PAIN IN THIRD TRIMESTER, ANTEPARTUM: ICD-10-CM

## 2024-05-21 DIAGNOSIS — M25.559 PREGNANCY RELATED HIP PAIN IN THIRD TRIMESTER, ANTEPARTUM: ICD-10-CM

## 2024-05-21 DIAGNOSIS — R29.898 DECREASED STRENGTH OF LOWER EXTREMITY: Primary | ICD-10-CM

## 2024-05-21 PROCEDURE — 97110 THERAPEUTIC EXERCISES: CPT

## 2024-05-21 PROCEDURE — 97162 PT EVAL MOD COMPLEX 30 MIN: CPT

## 2024-05-21 NOTE — PROGRESS NOTES
Presents at 28w0d for KELLIE. Doing well, some hip pain- history of fracture. Will refer for PT. Tdap today. FU in 2 weeks for KELLIE.

## 2024-05-21 NOTE — PLAN OF CARE
[facility]   Physical Therapy Initial Evaluation      Name: Arianna Kraft  [facility] Number: [id number]    Therapy Diagnosis:   Encounter Diagnoses   Name Primary?    Pregnancy related hip pain in third trimester, antepartum     Decreased strength of lower extremity Yes        Physician: Milady Jameson, NP    Physician Orders: PT Eval and Treat   Medical Diagnosis from Referral:   O26.893,M25.559 (ICD-10-CM) - Pregnancy related hip pain in third trimester, antepartum   Z87.81 (ICD-10-CM) - History of hip fracture   Evaluation Date: 5/21/2024  Authorization Period Expiration: TBD  Plan of Care Expiration: 8/21/2024  Progress Note Due: 6/21/2024  Visit # / Visits authorized: 1/ 1   FOTO: 1/ 3    Precautions: Standard and pregnant and HTN    Time In: 7:39am  Time Out: 8:15am  Total Billable Time: 36 minutes    Subjective     Date of onset: about a month ago    History of current condition - Arianna reports: She had an acetabulum fracture 8-10 years ago and hasn't had an problems since then until being pregnant. She started feeling the pain a few weeks ago. She feels it more in the back of the hip and feels the pain in the same spot. This is her first pregnancy and she is 29 weeks along. She denies mechanical symptoms, buckling. She feels a little numbness/tingling in R foot. She feels it more when stationary after a while such as when waking up. She feels does yoga and spin and finds that balance exercises are much harder on the R side. Sitting in chair for meetings will increase the pain. She also bought a house and moved in the last few weeks.     Falls: none recently    Imaging: none recently    Prior Therapy: yes after fracture   Social History: lives with family in [address] [facility] with handrail  Occupation:  - stairs and variable  Prior Level of Function: independent   Current Level of Function: independent with hip pain and difficulty sitting for long  periods    Pain:  Current 0/10, worst 8/10, best 0/10   Location: right hip   Description: Aching  Aggravating Factors: evenings, sitting and big movements of hip  Easing Factors: repositioning, stretching, foam rolling and exercise    Patients goals: to decrease the pain in the back of the hip and have some things to know what to do when it does flare up.     Medical History:   Past Medical History:   Diagnosis Date    Abnormal Pap smear of cervix        Surgical History:   Arianna Kraft  has no past surgical history on file.    Medications:   Arianna has a current medication list which includes the following prescription(s): aspirin, doxylamine succinate, doxylamine-pyridoxine (vit b6), ondansetron, ondansetron, prenatal vit no.124/iron/folic, and terconazole.    Allergies:   Review of patient's allergies indicates:  No Known Allergies     Objective         Right Left Comment: ! = pain   Hip Flexion: 4+/5 4+/5    Hip ABD: 4-/5 4/5    Hip ADD: 4+/5 4+/5    Hip IR: 4-/5! 4+/5    Hip ER: 4+/5 4+/5       Right Left Comment ! = pain   Knee extension: 5/5 5/5    Knee flexion: 5/5 5/5      CAROLINA: positive     Thigh thrust: negative    Hip ROM:    R: IR = 22;  ER = 31    L: IR = 20; ER = 30        Intake Outcome Measure for FOTO Survey    Therapist reviewed FOTO scores for Arianna Kraft on 5/21/2024.   FOTO report - see Media section or FOTO account episode details.    Intake Score: 46%         Treatment     Total Treatment time (time-based codes) separate from Evaluation: 10 minutes     Arianna received the treatments listed below:      therapeutic exercises to develop strength, endurance, ROM, flexibility, posture, and core stabilization for 10 minutes including:  SL clam 1x1' B  SL hip abd 1x1' B  Seated Fig 4 stretch 5x15s    Next session:  Hip abd iso progressions  SLR boxes  Wall clams    Patient Education and Home Exercises     Education provided:   - Role of PT  - PT POC  - HEP    Written Home Exercises  Provided: yes. Exercises were reviewed and Arianna was able to demonstrate them prior to the end of the session.  Arianna demonstrated good  understanding of the education provided. See EMR under Patient Instructions for exercises provided during therapy sessions.    Assessment     Arianna is a 38 y.o. female referred to outpatient Physical Therapy with a medical diagnosis of pregnancy related hip pain in third trimester and history of hip fracture. Patient presents with Symptoms consistent with medical diagnoses and impairments that are effecting their daily life. She impaired in hip strength and ROM and in motor control of hip.     Patient prognosis is Excellent.   Patient will benefit from skilled outpatient Physical Therapy to address the deficits stated above and in the chart below, provide patient /family education, and to maximize patientt's level of independence.     Plan of care discussed with patient: Yes  Patient's spiritual, cultural and educational needs considered and patient is agreeable to the plan of care and goals as stated below:     Anticipated Barriers for therapy: pregnancy, hx of hip fx    Medical Necessity is demonstrated by the following  History  Co-morbidities and personal factors that may impact the plan of care [] LOW: no personal factors / co-morbidities  [x] MODERATE: 1-2 personal factors / co-morbidities  [] HIGH: 3+ personal factors / co-morbidities    Moderate / High Support Documentation:   Co-morbidities affecting plan of care: see history above    Personal Factors:   no deficits     Examination  Body Structures and Functions, activity limitations and participation restrictions that may impact the plan of care [] LOW: addressing 1-2 elements  [x] MODERATE: 3+ elements  [] HIGH: 4+ elements (please support below)    Moderate / High Support Documentation: pain, ROM, strength     Clinical Presentation [] LOW: stable  [x] MODERATE: Evolving  [] HIGH: Unstable     Decision Making/  Complexity Score: moderate       Goals:  Short Term Goals (4 Weeks):   1. Pt will be compliant with HEP to supplement PT in restoring pain free function.  2. Pt will improve impaired LE MMTs by 1/2 grade  to improve strength for functional tasks  3. Pt improve impaired hip IR ROM by 5 deg to improve mobility for normal movement patterns.   Long Term Goals (8 Weeks):  1. Pt will improve FOTO score to </= 69% limited to decrease perceived limitation with mobility  2.  Pt will improve impaired LE MMTs by 1 grade to improve strength for functional tasks.  4. Pt improve impaired hip ROM to WNL in all planes to improve mobility for normal movement patterns.     Plan     Plan of care Certification: 5/21/2024 to 8/21/2024.    Outpatient Physical Therapy 2 times weekly for 12 weeks to include the following interventions:  Gait Training, Manual Therapy, Moist Heat/ Ice, Neuromuscular Re-ed, Therapeutic Activities, Therapeutic Exercise, Ultrasound, and dry needling, IASTM, and kinesiotaping PRN.       Gaston Carvalho, PT        Physician's Signature: _________________________________________ Date: ________________

## 2024-05-24 ENCOUNTER — PATIENT MESSAGE (OUTPATIENT)
Dept: OTHER | Facility: OTHER | Age: 39
End: 2024-05-24
Payer: COMMERCIAL

## 2024-05-27 ENCOUNTER — CLINICAL SUPPORT (OUTPATIENT)
Dept: REHABILITATION | Facility: HOSPITAL | Age: 39
End: 2024-05-27
Payer: COMMERCIAL

## 2024-05-27 DIAGNOSIS — R29.898 DECREASED STRENGTH OF LOWER EXTREMITY: ICD-10-CM

## 2024-05-27 DIAGNOSIS — M25.559 PREGNANCY RELATED HIP PAIN IN THIRD TRIMESTER, ANTEPARTUM: Primary | ICD-10-CM

## 2024-05-27 DIAGNOSIS — O26.893 PREGNANCY RELATED HIP PAIN IN THIRD TRIMESTER, ANTEPARTUM: Primary | ICD-10-CM

## 2024-05-27 PROCEDURE — 97112 NEUROMUSCULAR REEDUCATION: CPT | Mod: CQ

## 2024-05-27 PROCEDURE — 97110 THERAPEUTIC EXERCISES: CPT | Mod: CQ

## 2024-05-27 NOTE — PROGRESS NOTES
"[facility]   Physical Therapy Treatment Note      Name: Arianna [facility]  Clinic Number: [id number]    Therapy Diagnosis:   Encounter Diagnoses   Name Primary?    Pregnancy related hip pain in third trimester, antepartum Yes    Decreased strength of lower extremity      Physician: Milady Jameson, NP    Visit Date: 5/27/2024       Physician Orders: PT Eval and Treat   Medical Diagnosis from Referral:   O26.893,M25.559 (ICD-10-CM) - Pregnancy related hip pain in third trimester, antepartum   Z87.81 (ICD-10-CM) - History of hip fracture   Evaluation Date: 5/21/2024  Authorization Period Expiration: TBD  Plan of Care Expiration: 8/21/2024  Progress Note Due: 6/21/2024  Visit # / Visits authorized: 1/ 1   FOTO: 1/ 3     Precautions: Standard and pregnant and HTN     PTA Visit #: 1/5     Time In: 4:02 pm   Time Out: 4:36 pm   Total Billable Time: 34 minutes    Subjective     Pt reports: that she is feeling about the same but has been noticing more pain/discomfort up into her lower to mid back but isn't sure if it's related to her hip or due to pregnancy.  She was compliant with home exercise program.  Response to previous treatment:   Functional change: progressing    Pain: less than 5/10  Location: right hip into lower to mid back    Objective      Objective Measures updated at progress report unless specified.     Treatment     Arianna received the treatments listed below:      therapeutic exercises to develop strength, endurance, ROM, and flexibility for 22 minutes including:  Seated Fig 4 stretch 5x15s  Inclined SLR boxes 2x10   Seated hip IR c/ adductor squeeze 3" 2x10   Standing hip ext @ 45* 2x10 BLE     neuromuscular re-education activities to improve: Coordination, Proprioception, Posture, and Motor Control for 12 minutes. The following activities were included:  SL clam 1x1' B  SL hip abd 1x1' B  Wall clams 3" 10x   Quadruped fire hydrant isometric 1x1' B  Standing march c/ RTB @ " feet 2x10 BLE       therapeutic activities to improve functional performance for 0  minutes, including:        Patient Education and Home Exercises       Education provided:   - HEP    Written Home Exercises Provided: yes. Exercises were reviewed and Arianna was able to demonstrate them prior to the end of the session.  Arianna demonstrated good  understanding of the education provided. See EMR under Patient Instructions for exercises provided during therapy sessions    Assessment     Initiated therapy program following pt's initial evaluation with no adverse effects. Incorporated activities for improved bilateral LE strength per established goals. Will consider abdominal kinesiotaping for stabilization and easing of low back symptoms.     Arianna Is progressing well towards her goals.   Pt prognosis is Good.     Pt will continue to benefit from skilled outpatient physical therapy to address the deficits listed in the problem list box on initial evaluation, provide pt/family education and to maximize pt's level of independence in the home and community environment.     Pt's spiritual, cultural and educational needs considered and pt agreeable to plan of care and goals.     Anticipated barriers to physical therapy: none    Goals:   Short Term Goals (4 Weeks):   1. Pt will be compliant with HEP to supplement PT in restoring pain free function.  2. Pt will improve impaired LE MMTs by 1/2 grade  to improve strength for functional tasks  3. Pt improve impaired hip IR ROM by 5 deg to improve mobility for normal movement patterns.   Long Term Goals (8 Weeks):  1. Pt will improve FOTO score to </= 69% limited to decrease perceived limitation with mobility  2.  Pt will improve impaired LE MMTs by 1 grade to improve strength for functional tasks.  4. Pt improve impaired hip ROM to WNL in all planes to improve mobility for normal movement patterns.     Plan     Plan of care Certification: 5/21/2024 to 8/21/2024.    PT/PTA  met face to face to discuss pt's treatment plan and progress towards established goals. Pt will be seen by a physical therapist minimally every 6th visit or every 30 days.      Arianne Larkin PTA

## 2024-05-29 NOTE — PROGRESS NOTES
32w3d without major complaints.   U/S for growth 2/2 AMA shows 42%ile EFW.   PPC discussed and patient would is considering pOCPs.   Timing of delivery discussed and patient would like IOL around 39 weeks.    RTC in 2 weeks for routine PNC.

## 2024-05-29 NOTE — PROGRESS NOTES
"  [facility]   Physical Therapy Treatment Note      Name: Arianna [facility]  Clinic Number: [id number]    Therapy Diagnosis:   Encounter Diagnoses   Name Primary?    Pregnancy related hip pain in third trimester, antepartum Yes    Decreased strength of lower extremity        Physician: Milady Jameson NP    Visit Date: 5/30/2024       Physician Orders: PT Eval and Treat   Medical Diagnosis from Referral:   O26.893,M25.559 (ICD-10-CM) - Pregnancy related hip pain in third trimester, antepartum   Z87.81 (ICD-10-CM) - History of hip fracture   Evaluation Date: 5/21/2024  Authorization Period Expiration: 12/31/24  Plan of Care Expiration: 8/21/2024  Progress Note Due: 6/21/2024  Visit # / Visits authorized: 1/ 1, 1/20  FOTO: 1/ 3     Precautions: Standard and pregnant and HTN     PTA Visit #: 1/5     Time In: 7:41 am   Time Out: 8:20 am   Total Billable Time: 39 minutes    Subjective     Pt reports: that she is still having a little bit of pain in her hip and still notices some pain going up into her back, which pt attributes to sitting in small chair at work.   She was compliant with home exercise program.  Response to previous treatment:   Functional change: progressing    Pain: less than 5/10  Location: right hip into lower to mid back    Objective      Objective Measures updated at progress report unless specified.     Treatment     Arianna received the treatments listed below:      therapeutic exercises to develop strength, endurance, ROM, and flexibility for 27 minutes including:  Seated Fig 4 stretch 5x15s  Inclined SLR boxes 2x10   Seated hip IR c/ adductor squeeze 3" 2x10   Standing hip ext @ 45* 2x10 BLE   Standing hip abd 2x10 BLE   Standing open books 10x 5" schuyler   Seated R QL stretch 10x10"   Quadruped cat/camel c/ deep inhale/exhale 10x    neuromuscular re-education activities to improve: Coordination, Proprioception, Posture, and Motor Control for 12 minutes. The following " "activities were included:  SL clam 1x1' B  SL hip abd 1x1' B  Wall clams 3" 10x   Quadruped fire hydrant isometric 1x1' B  Standing march c/ RTB @ feet 2x10 BLE       therapeutic activities to improve functional performance for 0  minutes, including:        Patient Education and Home Exercises       Education provided:   - HEP    Written Home Exercises Provided: yes. Exercises were reviewed and Arianna was able to demonstrate them prior to the end of the session.  Arianna demonstrated good  understanding of the education provided. See EMR under Patient Instructions for exercises provided during therapy sessions    Assessment     Pt exhibited tolerance to all activities performed this session. Incorporated several activities to address recent pain along right sided lower thoracic paraspinals. Palpable tightness noted bilaterally along thoracolumbar paraspinals, however more prominent noted on right compared to left. Will continue to monitor and address with manual therapy techniques if needed. Also incorporated additional hip strengthening without adverse effects noted. Will continue to progress pt towards goals per her tolerance.     Arianna Is progressing well towards her goals.   Pt prognosis is Good.     Pt will continue to benefit from skilled outpatient physical therapy to address the deficits listed in the problem list box on initial evaluation, provide pt/family education and to maximize pt's level of independence in the home and community environment.     Pt's spiritual, cultural and educational needs considered and pt agreeable to plan of care and goals.     Anticipated barriers to physical therapy: none    Goals:   Short Term Goals (4 Weeks):   1. Pt will be compliant with HEP to supplement PT in restoring pain free function.  2. Pt will improve impaired LE MMTs by 1/2 grade  to improve strength for functional tasks  3. Pt improve impaired hip IR ROM by 5 deg to improve mobility for normal movement " patterns.   Long Term Goals (8 Weeks):  1. Pt will improve FOTO score to </= 69% limited to decrease perceived limitation with mobility  2.  Pt will improve impaired LE MMTs by 1 grade to improve strength for functional tasks.  4. Pt improve impaired hip ROM to WNL in all planes to improve mobility for normal movement patterns.     Plan     Plan of care Certification: 5/21/2024 to 8/21/2024.    PT/PTA met face to face to discuss pt's treatment plan and progress towards established goals. Pt will be seen by a physical therapist minimally every 6th visit or every 30 days.      Arianne Larkin PTA

## 2024-05-30 ENCOUNTER — CLINICAL SUPPORT (OUTPATIENT)
Dept: REHABILITATION | Facility: HOSPITAL | Age: 39
End: 2024-05-30
Payer: COMMERCIAL

## 2024-05-30 DIAGNOSIS — M25.559 PREGNANCY RELATED HIP PAIN IN THIRD TRIMESTER, ANTEPARTUM: Primary | ICD-10-CM

## 2024-05-30 DIAGNOSIS — R29.898 DECREASED STRENGTH OF LOWER EXTREMITY: ICD-10-CM

## 2024-05-30 DIAGNOSIS — O26.893 PREGNANCY RELATED HIP PAIN IN THIRD TRIMESTER, ANTEPARTUM: Primary | ICD-10-CM

## 2024-05-30 PROCEDURE — 97110 THERAPEUTIC EXERCISES: CPT | Mod: CQ

## 2024-05-30 PROCEDURE — 97112 NEUROMUSCULAR REEDUCATION: CPT | Mod: CQ

## 2024-05-31 NOTE — PROGRESS NOTES
"  [facility]   Physical Therapy Treatment Note      Name: Arianna [facility] Number: [id number]    Therapy Diagnosis:   Encounter Diagnoses   Name Primary?    Pregnancy related hip pain in third trimester, antepartum Yes    Decreased strength of lower extremity          Physician: Milady Jameson NP    Visit Date: 6/3/2024       Physician Orders: PT Eval and Treat   Medical Diagnosis from Referral:   O26.893,M25.559 (ICD-10-CM) - Pregnancy related hip pain in third trimester, antepartum   Z87.81 (ICD-10-CM) - History of hip fracture   Evaluation Date: 5/21/2024  Authorization Period Expiration: 12/31/24  Plan of Care Expiration: 8/21/2024  Progress Note Due: 6/21/2024  Visit # / Visits authorized: 1/ 1, 3/20  FOTO: 1/ 3     Precautions: Standard and pregnant and HTN     PTA Visit #: 2/5     Time In: 4:03 pm   Time Out: 4:57 pm   Total Billable Time: 54 minutes    Subjective     Pt reports: continued right sided back pain, and feels that she is more symptomatic at the end of the day.   She was compliant with home exercise program.  Response to previous treatment:   Functional change: progressing    Pain: less than 5/10  Location: right hip into lower to mid back    Objective      Objective Measures updated at progress report unless specified.     Treatment     Arianna received the treatments listed below:      therapeutic exercises to develop strength, endurance, ROM, and flexibility for 34 minutes including:  Seated Fig 4 stretch 5x15s  Inclined SLR boxes 2x10   Seated hip IR c/ adductor squeeze 3" 2x10   Standing hip ext YTB @ 45* 2x10 BLE   Standing hip abd YTB 2x10 BLE   Standing open books 10x 5" schuyler   Seated R QL stretch 10x10"   Quadruped cat/camel c/ deep inhale/exhale 10x    neuromuscular re-education activities to improve: Coordination, Proprioception, Posture, and Motor Control for 12 minutes. The following activities were included:  SL clam 1x1' B  SL hip abd 1x1' " "B  Wall clams 3" 10x   Quadruped fire hydrant isometric 1x1' B  Standing march c/ RTB @ feet 2x10 BLE       manual therapy techniques:  were applied to the: right sided low back for 8 minutes, including:  STM R paraspinals     therapeutic activities to improve functional performance for 0  minutes, including:        Patient Education and Home Exercises       Education provided:   - HEP    Written Home Exercises Provided: yes. Exercises were reviewed and Arianna was able to demonstrate them prior to the end of the session.  Arianna demonstrated good  understanding of the education provided. See EMR under Patient Instructions for exercises provided during therapy sessions    Assessment     Incorporated resistance band with standing hip abductions and extensions to further encourage increased LE strength. Pt continued with pain along right sided lumbothoracic spine, therefore incorporated manual therapy techniques. Noted increased tissue tone throughout musculature, however pt noted decreased back discomfort and continued hip pain after session.     Arianna Is progressing well towards her goals.   Pt prognosis is Good.     Pt will continue to benefit from skilled outpatient physical therapy to address the deficits listed in the problem list box on initial evaluation, provide pt/family education and to maximize pt's level of independence in the home and community environment.     Pt's spiritual, cultural and educational needs considered and pt agreeable to plan of care and goals.     Anticipated barriers to physical therapy: none    Goals:   Short Term Goals (4 Weeks):   1. Pt will be compliant with HEP to supplement PT in restoring pain free function.  2. Pt will improve impaired LE MMTs by 1/2 grade  to improve strength for functional tasks  3. Pt improve impaired hip IR ROM by 5 deg to improve mobility for normal movement patterns.   Long Term Goals (8 Weeks):  1. Pt will improve FOTO score to </= 69% limited to " decrease perceived limitation with mobility  2.  Pt will improve impaired LE MMTs by 1 grade to improve strength for functional tasks.  4. Pt improve impaired hip ROM to WNL in all planes to improve mobility for normal movement patterns.     Plan     Plan of care Certification: 5/21/2024 to 8/21/2024.    PT/PTA met face to face to discuss pt's treatment plan and progress towards established goals. Pt will be seen by a physical therapist minimally every 6th visit or every 30 days.      Arianne Larkin PTA

## 2024-06-03 ENCOUNTER — CLINICAL SUPPORT (OUTPATIENT)
Dept: REHABILITATION | Facility: HOSPITAL | Age: 39
End: 2024-06-03
Payer: COMMERCIAL

## 2024-06-03 DIAGNOSIS — R29.898 DECREASED STRENGTH OF LOWER EXTREMITY: ICD-10-CM

## 2024-06-03 DIAGNOSIS — M25.559 PREGNANCY RELATED HIP PAIN IN THIRD TRIMESTER, ANTEPARTUM: Primary | ICD-10-CM

## 2024-06-03 DIAGNOSIS — O26.893 PREGNANCY RELATED HIP PAIN IN THIRD TRIMESTER, ANTEPARTUM: Primary | ICD-10-CM

## 2024-06-03 PROCEDURE — 97140 MANUAL THERAPY 1/> REGIONS: CPT | Mod: CQ

## 2024-06-03 PROCEDURE — 97110 THERAPEUTIC EXERCISES: CPT | Mod: CQ

## 2024-06-03 PROCEDURE — 97112 NEUROMUSCULAR REEDUCATION: CPT | Mod: CQ

## 2024-06-06 ENCOUNTER — CLINICAL SUPPORT (OUTPATIENT)
Dept: REHABILITATION | Facility: HOSPITAL | Age: 39
End: 2024-06-06
Payer: COMMERCIAL

## 2024-06-06 DIAGNOSIS — R29.898 DECREASED STRENGTH OF LOWER EXTREMITY: ICD-10-CM

## 2024-06-06 DIAGNOSIS — M25.559 PREGNANCY RELATED HIP PAIN IN THIRD TRIMESTER, ANTEPARTUM: Primary | ICD-10-CM

## 2024-06-06 DIAGNOSIS — O26.893 PREGNANCY RELATED HIP PAIN IN THIRD TRIMESTER, ANTEPARTUM: Primary | ICD-10-CM

## 2024-06-06 PROCEDURE — 97530 THERAPEUTIC ACTIVITIES: CPT

## 2024-06-06 PROCEDURE — 97110 THERAPEUTIC EXERCISES: CPT

## 2024-06-06 PROCEDURE — 97140 MANUAL THERAPY 1/> REGIONS: CPT

## 2024-06-06 PROCEDURE — 97112 NEUROMUSCULAR REEDUCATION: CPT

## 2024-06-06 NOTE — PROGRESS NOTES
"  [facility]   Physical Therapy Treatment Note      Name: Arianna [facility]  Clinic Number: [id number]    Therapy Diagnosis:   Encounter Diagnoses   Name Primary?    Pregnancy related hip pain in third trimester, antepartum Yes    Decreased strength of lower extremity            Physician: Milady Jameson, NP    Visit Date: 6/6/2024       Physician Orders: PT Eval and Treat   Medical Diagnosis from Referral:   O26.893,M25.559 (ICD-10-CM) - Pregnancy related hip pain in third trimester, antepartum   Z87.81 (ICD-10-CM) - History of hip fracture   Evaluation Date: 5/21/2024  Authorization Period Expiration: 12/31/24  Plan of Care Expiration: 8/21/2024  Progress Note Due: 6/21/2024  Visit # / Visits authorized: 1/ 1, 4/20  FOTO: 1/ 3     Precautions: Standard and pregnant and HTN     PTA Visit #: 0/5     Time In: 7:39am  Time Out: 8:30am  Total Billable Time: 51 minutes    Subjective     Pt reports: sitting for long periods will cause an increase in pain especially later in the day. Massage and repositioning will help. Laying on her back helps but she can't stay for long.   She was compliant with home exercise program.  Response to previous treatment: improved symptoms  Functional change: progressing    Pain: less than 5/10  Location: right hip into lower to mid back    Objective      Objective Measures updated at progress report unless specified.     Treatment     Arianna received the treatments listed below:      therapeutic exercises to develop strength, endurance, ROM, and flexibility for 25 minutes including:  Seated Fig 4 stretch 5x15s  Inclined SLR boxes 2x10  NT  Seated hip IR c/ adductor squeeze 3" 2x10   Standing hip ext GTB @ 45* 2x10 BLE   Standing hip abd GTB 2x10 BLE   Standing open books 10x 5" schuyler - review for HEP only today  Seated R QL stretch 10x10"   Quadruped cat/camel c/ deep inhale/exhale 10x    neuromuscular re-education activities to improve: Coordination, " "Proprioception, Posture, and Motor Control for 10 minutes. The following activities were included:  SL clam 1x1' B  SL hip abd 1x1' B  Wall clams 3" 10x   Quadruped fire hydrant isometric 1x1' B      manual therapy techniques:  were applied to the: right sided low back for 8 minutes, including:  STM R paraspinals   Kinesio-taping to abdomen (smiley face pattern)    therapeutic activities to improve functional performance for 8  minutes, including:  Quadruped hip wall circles x20 each way B  Standing march c/ GTB @ feet 2x10 BLE     Patient Education and Home Exercises       Education provided:   - HEP    Written Home Exercises Provided: yes. Exercises were reviewed and Arianna was able to demonstrate them prior to the end of the session.  Arianna demonstrated good  understanding of the education provided. See EMR under Patient Instructions for exercises provided during therapy sessions    Assessment     Included progressions in manual therapy to address continued c/o LBP with good response and no adverse effects. Discussed safety awareness regarding positioning and balance during exercise classes as pregnancy progresses. She verbalized good understanding.     Arianna Is progressing well towards her goals.   Pt prognosis is Good.     Pt will continue to benefit from skilled outpatient physical therapy to address the deficits listed in the problem list box on initial evaluation, provide pt/family education and to maximize pt's level of independence in the home and community environment.     Pt's spiritual, cultural and educational needs considered and pt agreeable to plan of care and goals.     Anticipated barriers to physical therapy: none    Goals:   Short Term Goals (4 Weeks):   1. Pt will be compliant with HEP to supplement PT in restoring pain free function.  2. Pt will improve impaired LE MMTs by 1/2 grade  to improve strength for functional tasks  3. Pt improve impaired hip IR ROM by 5 deg to improve " mobility for normal movement patterns.   Long Term Goals (8 Weeks):  1. Pt will improve FOTO score to </= 69% limited to decrease perceived limitation with mobility  2.  Pt will improve impaired LE MMTs by 1 grade to improve strength for functional tasks.  4. Pt improve impaired hip ROM to WNL in all planes to improve mobility for normal movement patterns.     Plan     Plan of care Certification: 5/21/2024 to 8/21/2024.    PT/PTA met face to face to discuss pt's treatment plan and progress towards established goals. Pt will be seen by a physical therapist minimally every 6th visit or every 30 days.      Gaston Carvalho, PT

## 2024-06-07 ENCOUNTER — PATIENT MESSAGE (OUTPATIENT)
Dept: OTHER | Facility: OTHER | Age: 39
End: 2024-06-07
Payer: COMMERCIAL

## 2024-06-07 NOTE — PROGRESS NOTES
"  [facility]   Physical Therapy Treatment Note      Name: Arianna [facility] Number: [id number]    Therapy Diagnosis:   Encounter Diagnoses   Name Primary?    Pregnancy related hip pain in third trimester, antepartum Yes    Decreased strength of lower extremity      Physician: Mialdy Jameson, NP    Visit Date: 6/10/2024       Physician Orders: PT Eval and Treat   Medical Diagnosis from Referral:   O26.893,M25.559 (ICD-10-CM) - Pregnancy related hip pain in third trimester, antepartum   Z87.81 (ICD-10-CM) - History of hip fracture   Evaluation Date: 5/21/2024  Authorization Period Expiration: 12/31/24  Plan of Care Expiration: 8/21/2024  Progress Note Due: 6/21/2024  Visit # / Visits authorized: 1/ 1, 5/20  FOTO: 1/ 3     Precautions: Standard and pregnant and HTN     PTA Visit #: 1/5     Time In: 4:00 pm  Time Out: 5:15 pm  Total Billable Time: 75 minutes    Subjective     Pt reports: doing a lot of of sitting today, feels hip flared up a little bit today. Pt reports may have had a little bit of a reaction to tape adhesive which led her to take tape off early, but reports back pain was alleviated. Pt still open to trying taping again.   She was compliant with home exercise program.  Response to previous treatment: responded well to manual therapy techniques   Functional change: progressing    Pain:  5/10  Location: right hip into lower to mid back    Objective      Objective Measures updated at progress report unless specified.     Treatment     Arianna received the treatments listed below:      therapeutic exercises to develop strength, endurance, ROM, and flexibility for 28 minutes including:  Seated Fig 4 stretch 5x15s  Seated R QL stretch 10x10"   Standing open books 10x 5" schuyler   Quadruped cat/camel c/ deep inhale/exhale 10x  Seated hip IR c/ adductor squeeze 3" 2x10 YTB  Standing hip ext GTB @ 45* 2x10 BLE   Standing hip abd GTB 2x10 BLE   Inclined SLR boxes 2x10 BLE  Half " "Kneeling Hip Flexor Stretch 2x30" B    neuromuscular re-education activities to improve: Coordination, Proprioception, Posture, and Motor Control for 15 minutes. The following activities were included:  Wall clams 3" 10x B  SL clam 1x1' B  SL hip abd 1x1' B  Quadruped fire hydrant isometric 1x1' B    manual therapy techniques:  were applied to the: right sided low back for 17 minutes, including:  STM R paraspinals   Kinesio-taping to abdomen (smiley face pattern)  Cupping     therapeutic activities to improve functional performance for 15 minutes, including:  Quadruped hip wall circles x20 each way B  A/P pelvic tilts c/ SB x10 ea  Pelvic Circles c/ SB CW/CCW x10 ea   Alt standing march c/ GTB @ feet 2x10 BLE     Patient Education and Home Exercises       Education provided:   - HEP    Written Home Exercises Provided: yes. Exercises were reviewed and Arianna was able to demonstrate them prior to the end of the session.  Arianna demonstrated good  understanding of the education provided. See EMR under Patient Instructions for exercises provided during therapy sessions    Assessment     Incorporated seated pelvic tilt exercises with exercise ball to help improve pelvic ROM. Half kneeling hip flexor stretch was added secondary to patient reporting tightness in her groin area to help relieve muscle tension. Manual therapy techniques were continued from last visit secondary to pt reported good response and some relief from techniques after last session. Will continue to monitor pt and progress them towards their goals as tolerated.     Arianna Is progressing well towards her goals.   Pt prognosis is Good.     Pt will continue to benefit from skilled outpatient physical therapy to address the deficits listed in the problem list box on initial evaluation, provide pt/family education and to maximize pt's level of independence in the home and community environment.     Pt's spiritual, cultural and educational needs " considered and pt agreeable to plan of care and goals.     Anticipated barriers to physical therapy: none    Goals:   Short Term Goals (4 Weeks):   1. Pt will be compliant with HEP to supplement PT in restoring pain free function.  2. Pt will improve impaired LE MMTs by 1/2 grade  to improve strength for functional tasks  3. Pt improve impaired hip IR ROM by 5 deg to improve mobility for normal movement patterns.   Long Term Goals (8 Weeks):  1. Pt will improve FOTO score to </= 69% limited to decrease perceived limitation with mobility  2.  Pt will improve impaired LE MMTs by 1 grade to improve strength for functional tasks.  4. Pt improve impaired hip ROM to WNL in all planes to improve mobility for normal movement patterns.     Plan     Plan of care Certification: 5/21/2024 to 8/21/2024.    PT/PTA met face to face to discuss pt's treatment plan and progress towards established goals. Pt will be seen by a physical therapist minimally every 6th visit or every 30 days.    I certify that I was present in the room directing the student in service delivery and guiding them using my skilled judgment. As the co-signing therapist I have reviewed the students documentation and am responsible for the treatment, assessment, and plan.   IVÁN Swan PTA

## 2024-06-10 ENCOUNTER — CLINICAL SUPPORT (OUTPATIENT)
Dept: REHABILITATION | Facility: HOSPITAL | Age: 39
End: 2024-06-10
Payer: COMMERCIAL

## 2024-06-10 ENCOUNTER — TELEPHONE (OUTPATIENT)
Dept: OBSTETRICS AND GYNECOLOGY | Facility: CLINIC | Age: 39
End: 2024-06-10
Payer: COMMERCIAL

## 2024-06-10 ENCOUNTER — PROCEDURE VISIT (OUTPATIENT)
Dept: OBSTETRICS AND GYNECOLOGY | Facility: CLINIC | Age: 39
End: 2024-06-10
Payer: COMMERCIAL

## 2024-06-10 ENCOUNTER — PATIENT MESSAGE (OUTPATIENT)
Dept: OBSTETRICS AND GYNECOLOGY | Facility: CLINIC | Age: 39
End: 2024-06-10

## 2024-06-10 ENCOUNTER — ROUTINE PRENATAL (OUTPATIENT)
Dept: OBSTETRICS AND GYNECOLOGY | Facility: CLINIC | Age: 39
End: 2024-06-10
Payer: COMMERCIAL

## 2024-06-10 VITALS
WEIGHT: 208.13 LBS | HEART RATE: 87 BPM | DIASTOLIC BLOOD PRESSURE: 84 MMHG | BODY MASS INDEX: 32.6 KG/M2 | SYSTOLIC BLOOD PRESSURE: 124 MMHG

## 2024-06-10 DIAGNOSIS — R29.898 DECREASED STRENGTH OF LOWER EXTREMITY: ICD-10-CM

## 2024-06-10 DIAGNOSIS — M25.559 PREGNANCY RELATED HIP PAIN IN THIRD TRIMESTER, ANTEPARTUM: Primary | ICD-10-CM

## 2024-06-10 DIAGNOSIS — O09.512 PRIMIGRAVIDA OF ADVANCED MATERNAL AGE IN SECOND TRIMESTER: Primary | ICD-10-CM

## 2024-06-10 DIAGNOSIS — O09.512 PRIMIGRAVIDA OF ADVANCED MATERNAL AGE IN SECOND TRIMESTER: ICD-10-CM

## 2024-06-10 DIAGNOSIS — O26.893 PREGNANCY RELATED HIP PAIN IN THIRD TRIMESTER, ANTEPARTUM: Primary | ICD-10-CM

## 2024-06-10 DIAGNOSIS — Z3A.32 32 WEEKS GESTATION OF PREGNANCY: ICD-10-CM

## 2024-06-10 PROCEDURE — 97110 THERAPEUTIC EXERCISES: CPT | Mod: CQ

## 2024-06-10 PROCEDURE — 99999 PR PBB SHADOW E&M-EST. PATIENT-LVL III: CPT | Mod: PBBFAC,,, | Performed by: OBSTETRICS & GYNECOLOGY

## 2024-06-10 PROCEDURE — 97530 THERAPEUTIC ACTIVITIES: CPT | Mod: CQ

## 2024-06-10 PROCEDURE — 97140 MANUAL THERAPY 1/> REGIONS: CPT | Mod: CQ

## 2024-06-10 PROCEDURE — 76816 OB US FOLLOW-UP PER FETUS: CPT | Mod: S$GLB,,, | Performed by: OBSTETRICS & GYNECOLOGY

## 2024-06-10 PROCEDURE — 0502F SUBSEQUENT PRENATAL CARE: CPT | Mod: CPTII,S$GLB,, | Performed by: OBSTETRICS & GYNECOLOGY

## 2024-06-10 PROCEDURE — 97112 NEUROMUSCULAR REEDUCATION: CPT | Mod: CQ

## 2024-06-10 RX ORDER — OMEPRAZOLE 20 MG/1
20 TABLET, DELAYED RELEASE ORAL DAILY
COMMUNITY

## 2024-06-10 NOTE — TELEPHONE ENCOUNTER
----- Message from Leti Abrams MD sent at 6/10/2024 10:24 AM CDT -----  Can we please request an IOL for Arianna 7/26 at 4 pm?  She will be 39w0d. G1.   Thank you!

## 2024-06-10 NOTE — PROGRESS NOTES
Pt strong desires vaginal delivery, discussed timing of delivery and possible slight decreased r/o C/S with IOL closer to 39 than 40 weeks, so will plan on that for now.   Pt considering a .   Asking today about resources for anxiety and how PP depression/anxiety assessed and followed - sent pt recs for  mental health providers and briefly discussed that buspar would be a good option if needed.

## 2024-06-12 NOTE — PROGRESS NOTES
" [facility]   Physical Therapy Treatment Note      Name: Arianna [facility] Number: [id number]    Therapy Diagnosis:   Encounter Diagnoses   Name Primary?    Pregnancy related hip pain in third trimester, antepartum Yes    Decreased strength of lower extremity        Physician: Milady Jameson NP    Visit Date: 6/13/2024       Physician Orders: PT Eval and Treat   Medical Diagnosis from Referral:   O26.893,M25.559 (ICD-10-CM) - Pregnancy related hip pain in third trimester, antepartum   Z87.81 (ICD-10-CM) - History of hip fracture   Evaluation Date: 5/21/2024  Authorization Period Expiration: 12/31/24  Plan of Care Expiration: 8/21/2024  Progress Note Due: 6/21/2024  Visit # / Visits authorized: 1/ 1, 6/20  FOTO: 1/ 3     Precautions: Standard and pregnant and HTN     PTA Visit #: 2/5     Time In: 7:45 am  Time Out: 8:51 am  Total Billable Time: 51 minutes    Subjective     Pt reports: still having some groin tightness on R side but seems to be relieved some with hip flexor stretch given for home. Pt reports having reaction to adhesive on kinesiotape which led her to take it off within hours of application.   She was compliant with home exercise program.  Response to previous treatment: responded well to manual therapy techniques   Functional change: progressing    Pain:  0/10  Location: n/a    Objective      Objective Measures updated at progress report unless specified.     Treatment     Arianna received the treatments listed below:      therapeutic exercises to develop strength, endurance, ROM, and flexibility for 20 minutes including:  Seated Fig 4 stretch 2x30"    Seated R QL stretch 10x10" - NT  Standing open books 10x 5" schuyler - NT  Standing hip ext GTB @ 45* 2x10 BLE   Standing hip abd GTB 2x10 BLE   Seated hip IR c/ adductor squeeze 3" 2x10 YTB  Quadruped cat/camel c/ deep inhale/exhale 10x  Inclined SLR boxes 2x10 BLE  Half Kneeling Hip Flexor Stretch 2x30" B  B Hip Hikes " "lvl 2 step 2x10   B Hip Shifts c/ FR 3" x10     neuromuscular re-education activities to improve: Coordination, Proprioception, Posture, and Motor Control for 8  minutes. The following activities were included:  Wall clams 3" 10x B   SL clam 1x1' B  SL hip abd 1x1' B  Quadruped fire hydrant isometric 1x1' B    manual therapy techniques:  were applied to the: right sided low back for 15 minutes, including:  STM R paraspinals   Kinesio-taping to abdomen (smiley face pattern) - NT   Cupping     therapeutic activities to improve functional performance for 8 minutes, including:  A/P pelvic tilts c/ SB x10 ea  Pelvic Circles c/ SB CW/CCW x10 ea   Alt standing march c/ GTB @ feet 2x10 BLE  Quadruped hip wall circles x20 each way B       Patient Education and Home Exercises       Education provided:   - HEP    Written Home Exercises Provided: yes. Exercises were reviewed and Arianna was able to demonstrate them prior to the end of the session.  Arianna demonstrated good  understanding of the education provided. See EMR under Patient Instructions for exercises provided during therapy sessions    Assessment     Incorporated B hip shifts into program to help alleviate tension in groin region as well as to increase hip ROM with good response from the patient, especially on the R hip that has been feeling tight. Hip hike exercise was incorporated in order to further increase strength in B hip. Manual therapy technique kinesiotaping was held secondary to patient having skin reaction to adhesive. STM was performed and noted mild hypertonicity in the R thoracic paraspinals. Will continue to monitor pt and continue to progress them towards goals.     Arianna Is progressing well towards her goals.   Pt prognosis is Good.     Pt will continue to benefit from skilled outpatient physical therapy to address the deficits listed in the problem list box on initial evaluation, provide pt/family education and to maximize pt's level of " independence in the home and community environment.     Pt's spiritual, cultural and educational needs considered and pt agreeable to plan of care and goals.     Anticipated barriers to physical therapy: none    Goals:   Short Term Goals (4 Weeks):   1. Pt will be compliant with HEP to supplement PT in restoring pain free function.  2. Pt will improve impaired LE MMTs by 1/2 grade  to improve strength for functional tasks  3. Pt improve impaired hip IR ROM by 5 deg to improve mobility for normal movement patterns.   Long Term Goals (8 Weeks):  1. Pt will improve FOTO score to </= 69% limited to decrease perceived limitation with mobility  2.  Pt will improve impaired LE MMTs by 1 grade to improve strength for functional tasks.  4. Pt improve impaired hip ROM to WNL in all planes to improve mobility for normal movement patterns.     Plan     Plan of care Certification: 5/21/2024 to 8/21/2024.    PT/PTA met face to face to discuss pt's treatment plan and progress towards established goals. Pt will be seen by a physical therapist minimally every 6th visit or every 30 days.    I certify that I was present in the room directing the student in service delivery and guiding them using my skilled judgment. As the co-signing therapist I have reviewed the students documentation and am responsible for the treatment, assessment, and plan.   IVÁN Swan PTA

## 2024-06-13 ENCOUNTER — CLINICAL SUPPORT (OUTPATIENT)
Dept: REHABILITATION | Facility: HOSPITAL | Age: 39
End: 2024-06-13
Payer: COMMERCIAL

## 2024-06-13 DIAGNOSIS — O26.893 PREGNANCY RELATED HIP PAIN IN THIRD TRIMESTER, ANTEPARTUM: Primary | ICD-10-CM

## 2024-06-13 DIAGNOSIS — M25.559 PREGNANCY RELATED HIP PAIN IN THIRD TRIMESTER, ANTEPARTUM: Primary | ICD-10-CM

## 2024-06-13 DIAGNOSIS — R29.898 DECREASED STRENGTH OF LOWER EXTREMITY: ICD-10-CM

## 2024-06-13 PROCEDURE — 97530 THERAPEUTIC ACTIVITIES: CPT | Mod: CQ

## 2024-06-13 PROCEDURE — 97110 THERAPEUTIC EXERCISES: CPT | Mod: CQ

## 2024-06-13 PROCEDURE — 97140 MANUAL THERAPY 1/> REGIONS: CPT | Mod: CQ

## 2024-06-13 PROCEDURE — 97112 NEUROMUSCULAR REEDUCATION: CPT | Mod: CQ

## 2024-06-15 ENCOUNTER — HOSPITAL ENCOUNTER (EMERGENCY)
Facility: OTHER | Age: 39
Discharge: HOME | End: 2024-06-15
Attending: OBSTETRICS & GYNECOLOGY
Payer: COMMERCIAL

## 2024-06-15 VITALS
SYSTOLIC BLOOD PRESSURE: 123 MMHG | DIASTOLIC BLOOD PRESSURE: 72 MMHG | RESPIRATION RATE: 17 BRPM | TEMPERATURE: 98 F | HEART RATE: 59 BPM | OXYGEN SATURATION: 98 %

## 2024-06-15 DIAGNOSIS — Z3A.33 33 WEEKS GESTATION OF PREGNANCY: ICD-10-CM
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
## 2024-06-15 PROCEDURE — 99284 EMERGENCY DEPT VISIT MOD MDM: CPT | Mod: 25

## 2024-06-15 PROCEDURE — 99284 EMERGENCY DEPT VISIT MOD MDM: CPT | Mod: 25,,, | Performed by: OBSTETRICS & GYNECOLOGY

## 2024-06-15 PROCEDURE — 59025 FETAL NON-STRESS TEST: CPT | Mod: 26,,, | Performed by: OBSTETRICS & GYNECOLOGY

## 2024-06-15 PROCEDURE — 59025 FETAL NON-STRESS TEST: CPT

## 2024-06-15 NOTE — DISCHARGE INSTRUCTIONS
Call clinic ([phone]) or Labor & Delivery ([phone]) for any of the following:   - Vaginal bleeding   - Leakage of fluid  - Contractions 3-5 minutes apart for 2 hours or more  - Decreased fetal movement (10 or more movements in 2 hours, beginning at 28 weeks gestation)   - Headache unrelieved by Tylenol, pain underneath your right breast, or blurry vision  - Temperature of 100.4 or greater

## 2024-06-17 NOTE — PROGRESS NOTES
"  [facility]   Physical Therapy Treatment Note      Name: Arianna [facility]  Clinic Number: [id number]    Therapy Diagnosis:   Encounter Diagnoses   Name Primary?    Pregnancy related hip pain in third trimester, antepartum Yes    Decreased strength of lower extremity          Physician: Milady Jameson NP    Visit Date: 6/18/2024       Physician Orders: PT Eval and Treat   Medical Diagnosis from Referral:   O26.893,M25.559 (ICD-10-CM) - Pregnancy related hip pain in third trimester, antepartum   Z87.81 (ICD-10-CM) - History of hip fracture   Evaluation Date: 5/21/2024  Authorization Period Expiration: 12/31/24  Plan of Care Expiration: 8/21/2024  Progress Note Due: 6/21/2024  Visit # / Visits authorized: 1/ 1, 7/20  FOTO: 1/ 3     Precautions: Standard and pregnant and HTN     PTA Visit #: 0/5     Time In: 7:37am  Time Out: 8:18am  Total Billable Time: 41 minutes    Subjective     Pt reports: she feels like the morning sickness is coming back so she would like to do upright activities only today. She had an episode of pain when leaning fwd into the car to push down the seat.   She was compliant with home exercise program.  Response to previous treatment: responded well to manual therapy techniques   Functional change: progressing    Pain:  0/10  Location: n/a    Objective      Objective Measures updated at progress report unless specified.     Treatment     Arianna received the treatments listed below:      therapeutic exercises to develop strength, endurance, ROM, and flexibility for 15 minutes including:  Standing hip ext GTB @ 45* 2x10 BLE   Standing hip abd GTB 2x10 BLE   Seated hip IR c/ adductor squeeze 3" 2x10 YTB  Inclined SLR boxes 2x10 BLE  Half Kneeling Hip Flexor Stretch 2x30" B      NT:  Seated Fig 4 stretch 2x30"    Seated R QL stretch 10x10" - NT  Standing open books 10x 5" schuyler - NT  Quadruped cat/camel c/ deep inhale/exhale 10x  B Hip Hikes lvl 2 step 2x10   B Hip Shifts " "c/ FR 3" x10     neuromuscular re-education activities to improve: Coordination, Proprioception, Posture, and Motor Control for 00 minutes. The following activities were included:  Wall clams 3" 10x B   SL clam 1x1' B  SL hip abd 1x1' B  Quadruped fire hydrant isometric 1x1' B    manual therapy techniques:  were applied to the: right sided low back for 10 minutes, including:  STM R paraspinals  (not today)  Cupping     therapeutic activities to improve functional performance for 15 minutes, including:  A/P pelvic tilts c/ SB x10 ea  Pelvic Circles c/ SB CW/CCW x10 ea   Alt standing march c/ GTB @ feet 2x10 BLE  Standing hip circles x20 each way B  Standing YMB fwd press x20       Patient Education and Home Exercises       Education provided:   - HEP    Written Home Exercises Provided: yes. Exercises were reviewed and Arianna was able to demonstrate them prior to the end of the session.  Arianna demonstrated good  understanding of the education provided. See EMR under Patient Instructions for exercises provided during therapy sessions    Assessment     Pt with inc'd symptoms regarding nausea and abdominal discomfort with responses to position changes, so all activities performed upright today. She continues with R LBP and anterior hip pain. Plan to re-assess next session.     Arianna Is progressing well towards her goals.   Pt prognosis is Good.     Pt will continue to benefit from skilled outpatient physical therapy to address the deficits listed in the problem list box on initial evaluation, provide pt/family education and to maximize pt's level of independence in the home and community environment.     Pt's spiritual, cultural and educational needs considered and pt agreeable to plan of care and goals.     Anticipated barriers to physical therapy: none    Goals:   Short Term Goals (4 Weeks):   1. Pt will be compliant with HEP to supplement PT in restoring pain free function.  2. Pt will improve impaired LE MMTs " by 1/2 grade  to improve strength for functional tasks  3. Pt improve impaired hip IR ROM by 5 deg to improve mobility for normal movement patterns.   Long Term Goals (8 Weeks):  1. Pt will improve FOTO score to </= 69% limited to decrease perceived limitation with mobility  2.  Pt will improve impaired LE MMTs by 1 grade to improve strength for functional tasks.  4. Pt improve impaired hip ROM to WNL in all planes to improve mobility for normal movement patterns.     Plan     Plan of care Certification: 5/21/2024 to 8/21/2024.    PT/PTA met face to face to discuss pt's treatment plan and progress towards established goals. Pt will be seen by a physical therapist minimally every 6th visit or every 30 days.    I certify that I was present in the room directing the student in service delivery and guiding them using my skilled judgment. As the co-signing therapist I have reviewed the students documentation and am responsible for the treatment, assessment, and plan.   Saba Lares, IVÁN Carvalho, PT

## 2024-06-18 ENCOUNTER — CLINICAL SUPPORT (OUTPATIENT)
Dept: REHABILITATION | Facility: HOSPITAL | Age: 39
End: 2024-06-18
Payer: COMMERCIAL

## 2024-06-18 DIAGNOSIS — O26.893 PREGNANCY RELATED HIP PAIN IN THIRD TRIMESTER, ANTEPARTUM: Primary | ICD-10-CM

## 2024-06-18 DIAGNOSIS — R29.898 DECREASED STRENGTH OF LOWER EXTREMITY: ICD-10-CM

## 2024-06-18 DIAGNOSIS — M25.559 PREGNANCY RELATED HIP PAIN IN THIRD TRIMESTER, ANTEPARTUM: Primary | ICD-10-CM

## 2024-06-18 PROCEDURE — 97140 MANUAL THERAPY 1/> REGIONS: CPT

## 2024-06-18 PROCEDURE — 97110 THERAPEUTIC EXERCISES: CPT

## 2024-06-18 PROCEDURE — 97530 THERAPEUTIC ACTIVITIES: CPT

## 2024-06-20 ENCOUNTER — CLINICAL SUPPORT (OUTPATIENT)
Dept: REHABILITATION | Facility: HOSPITAL | Age: 39
End: 2024-06-20
Payer: COMMERCIAL

## 2024-06-20 DIAGNOSIS — R29.898 DECREASED STRENGTH OF LOWER EXTREMITY: ICD-10-CM

## 2024-06-20 DIAGNOSIS — O26.893 PREGNANCY RELATED HIP PAIN IN THIRD TRIMESTER, ANTEPARTUM: Primary | ICD-10-CM

## 2024-06-20 DIAGNOSIS — M25.559 PREGNANCY RELATED HIP PAIN IN THIRD TRIMESTER, ANTEPARTUM: Primary | ICD-10-CM

## 2024-06-20 PROCEDURE — 97530 THERAPEUTIC ACTIVITIES: CPT

## 2024-06-20 PROCEDURE — 97110 THERAPEUTIC EXERCISES: CPT

## 2024-06-20 PROCEDURE — 97140 MANUAL THERAPY 1/> REGIONS: CPT

## 2024-06-20 NOTE — PROGRESS NOTES
[facility]   Physical Therapy Progress and Treatment Note      Name: Arianna [facility] Number: [id number]    Therapy Diagnosis:   Encounter Diagnoses   Name Primary?    Pregnancy related hip pain in third trimester, antepartum Yes    Decreased strength of lower extremity            Physician: Milady Jameson NP    Visit Date: 6/20/2024       Physician Orders: PT Eval and Treat   Medical Diagnosis from Referral:   O26.893,M25.559 (ICD-10-CM) - Pregnancy related hip pain in third trimester, antepartum   Z87.81 (ICD-10-CM) - History of hip fracture   Evaluation Date: 5/21/2024  Authorization Period Expiration: 12/31/24  Plan of Care Expiration: 8/21/2024  Progress Note Due: 6/21/2024  Visit # / Visits authorized: 1/ 1, 8/20  FOTO: 1/ 3     Precautions: Standard and pregnant and HTN     PTA Visit #: 0/5     Time In: 7:52am  Time Out: 8:35am  Total Billable Time: 43 minutes    Subjective     Pt reports: she feels like the hip pain has improved 50% since starting therapy. She does feel like the back pain is progressing but feels like it is worse on days she is sitting for work for long periods of time.   She was compliant with home exercise program.  Response to previous treatment: responded well to manual therapy techniques   Functional change: progressing    Pain:  0/10  Location: n/a    Objective      Objective Measures updated at progress report unless specified.         Right Left Comment: ! = pain   Hip Flexion: 4+/5 5/5     Hip ABD: 4/5 4+/5     Hip ADD: 4+/5 5/5     Hip IR: 4/5 4+/5     Hip ER: 4+/5 5/5          Right Left Comment ! = pain   Knee extension: 5/5 5/5     Knee flexion: 5/5 5/5         Hip ROM:    R: IR = 25;  ER = 31    L: IR = 25; ER = 35    Treatment     Arianna received the treatments listed below:      therapeutic exercises to develop strength, endurance, ROM, and flexibility for 15 minutes including:  Standing hip ext GTB @ 45* 2x10 BLE   Standing hip abd GTB  "2x10 BLE   Seated hip IR c/ adductor squeeze 3" 2x10 YTB  Objective measures    Inclined SLR boxes 2x10 BLE (not today)  Half Kneeling Hip Flexor Stretch 2x30" B (not today)      NT:  Seated Fig 4 stretch 2x30"    Seated R QL stretch 10x10" - NT  Standing open books 10x 5" schuyler - NT  Quadruped cat/camel c/ deep inhale/exhale 10x  B Hip Hikes lvl 2 step 2x10   B Hip Shifts c/ FR 3" x10     neuromuscular re-education activities to improve: Coordination, Proprioception, Posture, and Motor Control for 03 minutes. The following activities were included:  Wall clams 3" 10x B     NT:  SL clam 1x1' B  SL hip abd 1x1' B  Quadruped fire hydrant isometric 1x1' B    manual therapy techniques:  were applied to the: right sided low back for 10 minutes, including:  STM R paraspinals   Cupping     therapeutic activities to improve functional performance for 15 minutes, including:  A/P pelvic tilts c/ SB x10 ea  Pelvic Circles c/ SB CW/CCW x10 ea   Alt standing march c/ GTB @ feet 2x10 BLE  Standing hip circles x20 each way B  Standing YMB fwd press x20       Patient Education and Home Exercises       Education provided:   - HEP    Written Home Exercises Provided: yes. Exercises were reviewed and Arianna was able to demonstrate them prior to the end of the session.  Arianna demonstrated good  understanding of the education provided. See EMR under Patient Instructions for exercises provided during therapy sessions    Assessment     Pt presents with significant improvements in subjective reports and objective measures as compared to initial evaluation as indicated above. She shows significant improvement in hip strength and activity tolerance despite progressing pregnancy. Her low back pain has shown less improvement which is likely due to the progressing pregnancy and nature of work right now with prolonged sitting for meetings. Discussed pelvic floor PT as possibility following pregnancy as pt may have continued back and hip pain " following birth, so encouraged pt to discuss this with her MD.     Arianna Is progressing well towards her goals.   Pt prognosis is Good.     Pt will continue to benefit from skilled outpatient physical therapy to address the deficits listed in the problem list box on initial evaluation, provide pt/family education and to maximize pt's level of independence in the home and community environment.     Pt's spiritual, cultural and educational needs considered and pt agreeable to plan of care and goals.     Anticipated barriers to physical therapy: none    Goals:   Short Term Goals (4 Weeks):   1. Pt will be compliant with HEP to supplement PT in restoring pain free function.  2. Pt will improve impaired LE MMTs by 1/2 grade  to improve strength for functional tasks  3. Pt improve impaired hip IR ROM by 5 deg to improve mobility for normal movement patterns.   Long Term Goals (8 Weeks):  1. Pt will improve FOTO score to </= 69% limited to decrease perceived limitation with mobility  2.  Pt will improve impaired LE MMTs by 1 grade to improve strength for functional tasks.  4. Pt improve impaired hip ROM to WNL in all planes to improve mobility for normal movement patterns.     Plan     Plan of care Certification: 5/21/2024 to 8/21/2024.    PT/PTA met face to face to discuss pt's treatment plan and progress towards established goals. Pt will be seen by a physical therapist minimally every 6th visit or every 30 days.    I certify that I was present in the room directing the student in service delivery and guiding them using my skilled judgment. As the co-signing therapist I have reviewed the students documentation and am responsible for the treatment, assessment, and plan.   Saba Lares, IVÁN Carvalho, PT

## 2024-06-21 ENCOUNTER — OFFICE VISIT (OUTPATIENT)
Dept: OBSTETRICS AND GYNECOLOGY | Facility: CLINIC | Age: 39
End: 2024-06-21
Payer: COMMERCIAL

## 2024-06-21 DIAGNOSIS — Z87.81 HISTORY OF HIP FRACTURE: ICD-10-CM

## 2024-06-21 DIAGNOSIS — M25.559 PREGNANCY RELATED HIP PAIN IN THIRD TRIMESTER, ANTEPARTUM: ICD-10-CM

## 2024-06-21 DIAGNOSIS — Z3A.34 34 WEEKS GESTATION OF PREGNANCY: ICD-10-CM

## 2024-06-21 DIAGNOSIS — O09.513 PRIMIGRAVIDA OF ADVANCED MATERNAL AGE IN THIRD TRIMESTER: Primary | ICD-10-CM

## 2024-06-21 DIAGNOSIS — O26.893 PREGNANCY RELATED HIP PAIN IN THIRD TRIMESTER, ANTEPARTUM: ICD-10-CM

## 2024-06-21 NOTE — PROGRESS NOTES
The patient location is: Home  The chief complaint leading to consultation is: Routine PNC    Visit type: audiovisual    Face to Face time with patient: 20 minutes  20 minutes of total time spent on the encounter, which includes face to face time and non-face to face time preparing to see the patient (eg, review of tests), Obtaining and/or reviewing separately obtained history, Documenting clinical information in the electronic or other health record, Independently interpreting results (not separately reported) and communicating results to the patient/family/caregiver, or Care coordination (not separately reported).         Each patient to whom he or she provides medical services by telemedicine is:  (1) informed of the relationship between the physician and patient and the respective role of any other health care provider with respect to management of the patient; and (2) notified that he or she may decline to receive medical services by telemedicine and may withdraw from such care at any time.    Notes:     34w0d without major complaints.   Increasing prilosec to BID.  Birth plan discussed, all very routine requests.   3rd trimester labs and consents next visit.   RTC in 2 weeks for routine PNC.

## 2024-06-24 ENCOUNTER — CLINICAL SUPPORT (OUTPATIENT)
Dept: REHABILITATION | Facility: HOSPITAL | Age: 39
End: 2024-06-24
Payer: COMMERCIAL

## 2024-06-24 ENCOUNTER — TELEPHONE (OUTPATIENT)
Dept: OBSTETRICS AND GYNECOLOGY | Facility: CLINIC | Age: 39
End: 2024-06-24
Payer: COMMERCIAL

## 2024-06-24 DIAGNOSIS — M25.559 PREGNANCY RELATED HIP PAIN IN THIRD TRIMESTER, ANTEPARTUM: Primary | ICD-10-CM

## 2024-06-24 DIAGNOSIS — O26.893 PREGNANCY RELATED HIP PAIN IN THIRD TRIMESTER, ANTEPARTUM: Primary | ICD-10-CM

## 2024-06-24 DIAGNOSIS — R29.898 DECREASED STRENGTH OF LOWER EXTREMITY: ICD-10-CM

## 2024-06-24 DIAGNOSIS — O09.523 MULTIGRAVIDA OF ADVANCED MATERNAL AGE IN THIRD TRIMESTER: Primary | ICD-10-CM

## 2024-06-24 PROCEDURE — 97110 THERAPEUTIC EXERCISES: CPT

## 2024-06-24 PROCEDURE — 97112 NEUROMUSCULAR REEDUCATION: CPT

## 2024-06-24 PROCEDURE — 97140 MANUAL THERAPY 1/> REGIONS: CPT

## 2024-06-24 PROCEDURE — 97530 THERAPEUTIC ACTIVITIES: CPT

## 2024-06-24 NOTE — PROGRESS NOTES
[facility]   Physical Therapy Progress and Treatment Note      Name: Arianna [facility]  Clinic Number: [id number]    Therapy Diagnosis:   Encounter Diagnoses   Name Primary?    Pregnancy related hip pain in third trimester, antepartum Yes    Decreased strength of lower extremity          Physician: Milady Jameson NP    Visit Date: 6/24/2024       Physician Orders: PT Eval and Treat   Medical Diagnosis from Referral:   O26.893,M25.559 (ICD-10-CM) - Pregnancy related hip pain in third trimester, antepartum   Z87.81 (ICD-10-CM) - History of hip fracture   Evaluation Date: 5/21/2024  Authorization Period Expiration: 12/31/24  Plan of Care Expiration: 8/21/2024  Progress Note Due: 6/21/2024  Visit # / Visits authorized: 1/ 1, 9/20  FOTO: 1/3     Precautions: Standard and pregnant and HTN     PTA Visit #: 0/5     Time In: 400  Time Out: 445  Total Billable Time: 45 minutes    Subjective     Pt reports:  she feels like the hip pain has improved 50% since starting therapy. She does feel like the back pain is progressing but feels like it is worse on days she is sitting for work for long periods of time.         She was compliant with home exercise program.  Response to previous treatment: responded well to manual therapy techniques   Functional change: progressing    Pain:  0/10  Location: n/a    Objective      Objective Measures updated at progress report unless specified.         Right Left Comment: ! = pain   Hip Flexion: 4+/5 5/5     Hip ABD: 4/5 4+/5     Hip ADD: 4+/5 5/5     Hip IR: 4/5 4+/5     Hip ER: 4+/5 5/5          Right Left Comment ! = pain   Knee extension: 5/5 5/5     Knee flexion: 5/5 5/5         Hip ROM:    R: IR = 25;  ER = 31    L: IR = 25; ER = 35    Treatment     Arianna received the treatments listed below:      therapeutic exercises to develop strength, endurance, ROM, and flexibility for 10 minutes including:    Inclined SLR boxes 2x10 BLE  Standing hip ext GTB @ 45*  "2x10 BLE   Standing hip abd GTB 2x10 BLE   Seated hip IR c/ adductor squeeze 3" 2x10 YTB  Sidelying clams BTB 3x10         NT:  Seated Fig 4 stretch 2x30"    Seated R QL stretch 10x10" - NT  Standing open books 10x 5" schuyler - NT  Quadruped cat/camel c/ deep inhale/exhale 10x  B Hip Hikes lvl 2 step 2x10   B Hip Shifts c/ FR 3" x10     neuromuscular re-education activities to improve: Coordination, Proprioception, Posture, and Motor Control for 10 minutes. The following activities were included:    PPT with ring abd  - 2x10   PPT with Ball squeezes   - 2x10   Wall clams 3" 10x B       NT:  SL clam 1x1' B  SL hip abd 1x1' B  Quadruped fire hydrant isometric 1x1' B    manual therapy techniques:  were applied to the: right sided low back for 10  minutes, including:      Cupping   STM R paraspinals NT      therapeutic activities to improve functional performance for 15 minutes, including:    A/P pelvic tilts c/ SB x10 ea  Pelvic Circles c/ SB CW/CCW x10 ea   Alt standing march c/ GTB @ feet 2x10 BLE  Standing hip circles x20 each way B  Standing YMB fwd press x20       Patient Education and Home Exercises       Education provided:   - HEP    Written Home Exercises Provided: yes. Exercises were reviewed and Arianna was able to demonstrate them prior to the end of the session.  Arianna demonstrated good  understanding of the education provided. See EMR under Patient Instructions for exercises provided during therapy sessions    Assessment     Patient did well today with exercises to work on core/hip strength and mobility. Patient also tolerated manual today to address soft tissue tightness.     Arianna Is progressing well towards her goals.   Pt prognosis is Good.     Pt will continue to benefit from skilled outpatient physical therapy to address the deficits listed in the problem list box on initial evaluation, provide pt/family education and to maximize pt's level of independence in the home and community environment. "     Pt's spiritual, cultural and educational needs considered and pt agreeable to plan of care and goals.     Anticipated barriers to physical therapy: none    Goals:   Short Term Goals (4 Weeks):   1. Pt will be compliant with HEP to supplement PT in restoring pain free function.  2. Pt will improve impaired LE MMTs by 1/2 grade  to improve strength for functional tasks  3. Pt improve impaired hip IR ROM by 5 deg to improve mobility for normal movement patterns.   Long Term Goals (8 Weeks):  1. Pt will improve FOTO score to </= 69% limited to decrease perceived limitation with mobility  2.  Pt will improve impaired LE MMTs by 1 grade to improve strength for functional tasks.  4. Pt improve impaired hip ROM to WNL in all planes to improve mobility for normal movement patterns.     Plan     Plan of care Certification: 5/21/2024 to 8/21/2024.    PT/PTA met face to face to discuss pt's treatment plan and progress towards established goals. Pt will be seen by a physical therapist minimally every 6th visit or every 30 days.    I certify that I was present in the room directing the student in service delivery and guiding them using my skilled judgment. As the co-signing therapist I have reviewed the students documentation and am responsible for the treatment, assessment, and plan.   Saba Lares, IVÁN Lucas, PT

## 2024-06-24 NOTE — TELEPHONE ENCOUNTER
----- Message from Leti Abrams MD sent at 6/21/2024  1:20 PM CDT -----  Please move pt's induction to 7/31 as per her request. Thank you!

## 2024-06-26 ENCOUNTER — CLINICAL SUPPORT (OUTPATIENT)
Dept: REHABILITATION | Facility: HOSPITAL | Age: 39
End: 2024-06-26
Payer: COMMERCIAL

## 2024-06-26 DIAGNOSIS — M25.559 PREGNANCY RELATED HIP PAIN IN THIRD TRIMESTER, ANTEPARTUM: Primary | ICD-10-CM

## 2024-06-26 DIAGNOSIS — O26.893 PREGNANCY RELATED HIP PAIN IN THIRD TRIMESTER, ANTEPARTUM: Primary | ICD-10-CM

## 2024-06-26 DIAGNOSIS — R29.898 DECREASED STRENGTH OF LOWER EXTREMITY: ICD-10-CM

## 2024-06-26 PROCEDURE — 97112 NEUROMUSCULAR REEDUCATION: CPT | Mod: CQ

## 2024-06-26 PROCEDURE — 97110 THERAPEUTIC EXERCISES: CPT | Mod: CQ

## 2024-06-26 PROCEDURE — 97140 MANUAL THERAPY 1/> REGIONS: CPT | Mod: CQ

## 2024-06-26 PROCEDURE — 97530 THERAPEUTIC ACTIVITIES: CPT | Mod: CQ

## 2024-06-26 NOTE — PROGRESS NOTES
[facility]   Physical Therapy Progress and Treatment Note      Name: Arianna [facility] Number: [id number]    Therapy Diagnosis:   Encounter Diagnoses   Name Primary?    Pregnancy related hip pain in third trimester, antepartum Yes    Decreased strength of lower extremity        Physician: Milady Jameson NP    Visit Date: 6/26/2024       Physician Orders: PT Eval and Treat   Medical Diagnosis from Referral:   O26.893,M25.559 (ICD-10-CM) - Pregnancy related hip pain in third trimester, antepartum   Z87.81 (ICD-10-CM) - History of hip fracture   Evaluation Date: 5/21/2024  Authorization Period Expiration: 12/31/24  Plan of Care Expiration: 8/21/2024  Progress Note Due: 6/21/2024  Visit # / Visits authorized: 1/ 1, 10/20  FOTO: 1/3     Precautions: Standard and pregnant and HTN     PTA Visit #: 1/5     Time In: 3:17 pm   Time Out: 3:10 pm   Total Billable Time: 53 minutes    Subjective     Pt reports:  still attending exercise classes with no issue, however in combination with walking and assembling a cabinet, she felt a little more pain and achy the following day. Pt states that she still gets intense pain and back/hip area depending on movement.         She was compliant with home exercise program.  Response to previous treatment: responded well to manual therapy techniques   Functional change: progressing    Pain:  0/10  Location: n/a    Objective      Objective Measures updated at progress report unless specified.         Right Left Comment: ! = pain   Hip Flexion: 4+/5 5/5     Hip ABD: 4/5 4+/5     Hip ADD: 4+/5 5/5     Hip IR: 4/5 4+/5     Hip ER: 4+/5 5/5          Right Left Comment ! = pain   Knee extension: 5/5 5/5     Knee flexion: 5/5 5/5         Hip ROM:    R: IR = 25;  ER = 31    L: IR = 25; ER = 35    Treatment     Arianna received the treatments listed below:      therapeutic exercises to develop strength, endurance, ROM, and flexibility for 12 minutes  "including:    Inclined SLR boxes 2x10 BLE  Standing hip ext GTB @ 45* 2x10 BLE   Standing hip abd GTB 2x10 BLE   Seated hip IR c/ adductor squeeze 3" 2x10 YTB  Sidelying clams BTB 3x10         NT:  Seated Fig 4 stretch 2x30"    Seated R QL stretch 10x10" - NT  Standing open books 10x 5" schuyler - NT  Quadruped cat/camel c/ deep inhale/exhale 10x  B Hip Hikes lvl 2 step 2x10   B Hip Shifts c/ FR 3" x10     neuromuscular re-education activities to improve: Coordination, Proprioception, Posture, and Motor Control for 8 minutes. The following activities were included:    PPT with ring abd  - 2x10   PPT with Ball squeezes   - 2x10   Wall clams 3" 10x B       NT:  SL clam 1x1' B  SL hip abd 1x1' B  Quadruped fire hydrant isometric 1x1' B    manual therapy techniques:  were applied to the: right sided low back for 10  minutes, including:      Cupping   STM R paraspinals NT      therapeutic activities to improve functional performance for 23 minutes, including:    A/P pelvic tilts c/ SB x10 ea  Pelvic Circles c/ SB CW/CCW x10 ea   Alt standing march c/ GTB @ feet 2x10 BLE  Standing hip circles x20 each way B  Standing YMB fwd press x20  Unilateral march c/ 10lb KB 2x10 RLE (LLE on lvl 2 step)        Patient Education and Home Exercises       Education provided:   - HEP    Written Home Exercises Provided: yes. Exercises were reviewed and Arianna was able to demonstrate them prior to the end of the session.  Arianna demonstrated good  understanding of the education provided. See EMR under Patient Instructions for exercises provided during therapy sessions    Assessment     Continued with established activities for improved hip and lumbopelvic complex strength and stabilization. Incorporated unilateral hip flexion for improved functional strength of RLE, as notable weakness at last re-assessment observed. Manual therapy continued for decreased muscular hypertonicity and related pain at right thoracolumbar paraspinals. Will " continue to progress pt at her tolerance during upcoming sessions.     Arianna Is progressing well towards her goals.   Pt prognosis is Good.     Pt will continue to benefit from skilled outpatient physical therapy to address the deficits listed in the problem list box on initial evaluation, provide pt/family education and to maximize pt's level of independence in the home and community environment.     Pt's spiritual, cultural and educational needs considered and pt agreeable to plan of care and goals.     Anticipated barriers to physical therapy: none    Goals:   Short Term Goals (4 Weeks):   1. Pt will be compliant with HEP to supplement PT in restoring pain free function.  2. Pt will improve impaired LE MMTs by 1/2 grade  to improve strength for functional tasks  3. Pt improve impaired hip IR ROM by 5 deg to improve mobility for normal movement patterns.   Long Term Goals (8 Weeks):  1. Pt will improve FOTO score to </= 69% limited to decrease perceived limitation with mobility  2.  Pt will improve impaired LE MMTs by 1 grade to improve strength for functional tasks.  4. Pt improve impaired hip ROM to WNL in all planes to improve mobility for normal movement patterns.     Plan     Plan of care Certification: 5/21/2024 to 8/21/2024.    PT/PTA met face to face to discuss pt's treatment plan and progress towards established goals. Pt will be seen by a physical therapist minimally every 6th visit or every 30 days.      Arianne Larkin PTA

## 2024-06-28 ENCOUNTER — PATIENT MESSAGE (OUTPATIENT)
Dept: OTHER | Facility: OTHER | Age: 39
End: 2024-06-28
Payer: COMMERCIAL

## 2024-07-01 ENCOUNTER — CLINICAL SUPPORT (OUTPATIENT)
Dept: REHABILITATION | Facility: HOSPITAL | Age: 39
End: 2024-07-01
Payer: COMMERCIAL

## 2024-07-01 DIAGNOSIS — O26.893 PREGNANCY RELATED HIP PAIN IN THIRD TRIMESTER, ANTEPARTUM: Primary | ICD-10-CM

## 2024-07-01 DIAGNOSIS — M25.559 PREGNANCY RELATED HIP PAIN IN THIRD TRIMESTER, ANTEPARTUM: Primary | ICD-10-CM

## 2024-07-01 DIAGNOSIS — R29.898 DECREASED STRENGTH OF LOWER EXTREMITY: ICD-10-CM

## 2024-07-01 PROCEDURE — 97110 THERAPEUTIC EXERCISES: CPT | Mod: CQ

## 2024-07-01 PROCEDURE — 97140 MANUAL THERAPY 1/> REGIONS: CPT | Mod: CQ

## 2024-07-01 PROCEDURE — 97530 THERAPEUTIC ACTIVITIES: CPT | Mod: CQ

## 2024-07-01 PROCEDURE — 97112 NEUROMUSCULAR REEDUCATION: CPT | Mod: CQ

## 2024-07-01 NOTE — PROGRESS NOTES
[facility]   Physical Therapy Progress and Treatment Note      Name: Arianna Kraft  Clinic Number: [id number]    Therapy Diagnosis:   Encounter Diagnoses   Name Primary?    Pregnancy related hip pain in third trimester, antepartum Yes    Decreased strength of lower extremity          Physician: Milady Jameson NP    Visit Date: 7/1/2024       Physician Orders: PT Eval and Treat   Medical Diagnosis from Referral:   O26.893,M25.559 (ICD-10-CM) - Pregnancy related hip pain in third trimester, antepartum   Z87.81 (ICD-10-CM) - History of hip fracture   Evaluation Date: 5/21/2024  Authorization Period Expiration: 12/31/24  Plan of Care Expiration: 8/21/2024  Progress Note Due: 6/21/2024  Visit # / Visits authorized: 1/ 1, 10/20  FOTO: 1/3     Precautions: Standard and pregnant and HTN     PTA Visit #: 1/5     Time In: 3:52 pm   Time Out: 4:50 pm   Total Billable Time: 58 minutes    Subjective     Pt reports:  Sunday was hurting secondary to doing house projects throughout the weekend but feels good overall today. Still been going occasionally to work out classes with no new issues stated.  She was compliant with home exercise program.  Response to previous treatment: none stated   Functional change: progressing    Pain:  0/10  Location: n/a    Objective      Objective Measures updated at progress report unless specified.         Right Left Comment: ! = pain   Hip Flexion: 4+/5 5/5     Hip ABD: 4/5 4+/5     Hip ADD: 4+/5 5/5     Hip IR: 4/5 4+/5     Hip ER: 4+/5 5/5          Right Left Comment ! = pain   Knee extension: 5/5 5/5     Knee flexion: 5/5 5/5         Hip ROM:    R: IR = 25;  ER = 31    L: IR = 25; ER = 35    Treatment     Arianna received the treatments listed below:      therapeutic exercises to develop strength, endurance, ROM, and flexibility for 21 minutes including:    Inclined SLR boxes 2x10 BLE  Standing hip ext GTB @ 45* 2x10 BLE   Standing hip abd GTB 2x10 BLE   Seated  "hip IR c/ adductor squeeze 3" 2x10 YTB  Seated hip ER c/ abductor iso 3" 2x10   Sidelying clams BTB 3x10 B  1/2 kneeling hip flexor stretch on theradisc BLE 2x30"     NT:  Seated Fig 4 stretch 2x30"    Seated R QL stretch 10x10"   Standing open books 10x 5" schuyler   Quadruped cat/camel c/ deep inhale/exhale 10x  B Hip Hikes lvl 2 step 2x10   B Hip Shifts c/ FR 3" x10     neuromuscular re-education activities to improve: Coordination, Proprioception, Posture, and Motor Control for 8 minutes. The following activities were included:    PPT with ring abd  - 2x10   PPT with Ball squeezes   - 2x10   Wall clams 3" 10x B     NT:  SL clam 1x1' B  SL hip abd 1x1' B  Quadruped fire hydrant isometric 1x1' B    manual therapy techniques:  were applied to the: right sided low back for 13 minutes, including:    Cupping   STM R paraspinals     therapeutic activities to improve functional performance for 16 minutes, including:    A/P pelvic tilts c/ SB x10 ea  Pelvic Circles c/ SB CW/CCW x10 ea   Alt standing march c/ GTB @ feet 2x10 BLE  Standing hip circles x20 each way B  Standing YMB fwd press x20  Unilateral march c/ 10lb KB 2x10 RLE (LLE on lvl 2 step)      Patient Education and Home Exercises       Education provided:   - HEP    Written Home Exercises Provided: yes. Exercises were reviewed and Arianna was able to demonstrate them prior to the end of the session.  Arianna demonstrated good  understanding of the education provided. See EMR under Patient Instructions for exercises provided during therapy sessions    Assessment     Incorporated hip ER with abduction iso to target hip muscles and improve LE strength with good tolerance from the pt. 1/2 kneeling hip flexor stretch was added back in secondary to pt reports of R groin pain that seems to be alleviated with this stretch in previous sessions. Continued with manual therapy secondary to relief felt by pt following technique. Mild muscle hypertonicity noted in thoracic " paraspinals. Will continue to monitor pt and continue to progress her towards her goals as tolerated in future sessions.     Arianna Is progressing well towards her goals.   Pt prognosis is Good.     Pt will continue to benefit from skilled outpatient physical therapy to address the deficits listed in the problem list box on initial evaluation, provide pt/family education and to maximize pt's level of independence in the home and community environment.     Pt's spiritual, cultural and educational needs considered and pt agreeable to plan of care and goals.     Anticipated barriers to physical therapy: none    Goals:   Short Term Goals (4 Weeks):   1. Pt will be compliant with HEP to supplement PT in restoring pain free function.  2. Pt will improve impaired LE MMTs by 1/2 grade  to improve strength for functional tasks  3. Pt improve impaired hip IR ROM by 5 deg to improve mobility for normal movement patterns.   Long Term Goals (8 Weeks):  1. Pt will improve FOTO score to </= 69% limited to decrease perceived limitation with mobility  2.  Pt will improve impaired LE MMTs by 1 grade to improve strength for functional tasks.  4. Pt improve impaired hip ROM to WNL in all planes to improve mobility for normal movement patterns.     Plan     Plan of care Certification: 5/21/2024 to 8/21/2024.    PT/PTA met face to face to discuss pt's treatment plan and progress towards established goals. Pt will be seen by a physical therapist minimally every 6th visit or every 30 days.    I certify that I was present in the room directing the student in service delivery and guiding them using my skilled judgment. As the co-signing therapist I have reviewed the students documentation and am responsible for the treatment, assessment, and plan.   Arianne Larkin PTA

## 2024-07-03 ENCOUNTER — LAB VISIT (OUTPATIENT)
Dept: LAB | Facility: HOSPITAL | Age: 39
End: 2024-07-03
Attending: OBSTETRICS & GYNECOLOGY
Payer: COMMERCIAL

## 2024-07-03 ENCOUNTER — ROUTINE PRENATAL (OUTPATIENT)
Dept: OBSTETRICS AND GYNECOLOGY | Facility: CLINIC | Age: 39
End: 2024-07-03
Payer: COMMERCIAL

## 2024-07-03 VITALS
DIASTOLIC BLOOD PRESSURE: 88 MMHG | BODY MASS INDEX: 33.56 KG/M2 | SYSTOLIC BLOOD PRESSURE: 138 MMHG | WEIGHT: 214.31 LBS

## 2024-07-03 DIAGNOSIS — Z3A.35 35 WEEKS GESTATION OF PREGNANCY: ICD-10-CM

## 2024-07-03 DIAGNOSIS — Z3A.35 35 WEEKS GESTATION OF PREGNANCY: Primary | ICD-10-CM

## 2024-07-03 DIAGNOSIS — Z3A.36 36 WEEKS GESTATION OF PREGNANCY: ICD-10-CM

## 2024-07-03 LAB
BASOPHILS # BLD AUTO: 0.08 K/UL (ref 0–0.2)
BASOPHILS NFR BLD: 0.6 % (ref 0–1.9)
DIFFERENTIAL METHOD BLD: ABNORMAL
EOSINOPHIL # BLD AUTO: 0.1 K/UL (ref 0–0.5)
EOSINOPHIL NFR BLD: 1.1 % (ref 0–8)
ERYTHROCYTE [DISTWIDTH] IN BLOOD BY AUTOMATED COUNT: 12.8 % (ref 11.5–14.5)
HCT VFR BLD AUTO: 38.9 % (ref 37–48.5)
HGB BLD-MCNC: 13.1 G/DL (ref 12–16)
HIV 1+2 AB+HIV1 P24 AG SERPL QL IA: NORMAL
IMM GRANULOCYTES # BLD AUTO: 0.14 K/UL (ref 0–0.04)
IMM GRANULOCYTES NFR BLD AUTO: 1.1 % (ref 0–0.5)
LYMPHOCYTES # BLD AUTO: 1.7 K/UL (ref 1–4.8)
LYMPHOCYTES NFR BLD: 13.5 % (ref 18–48)
MCH RBC QN AUTO: 30.3 PG (ref 27–31)
MCHC RBC AUTO-ENTMCNC: 33.7 G/DL (ref 32–36)
MCV RBC AUTO: 90 FL (ref 82–98)
MONOCYTES # BLD AUTO: 0.9 K/UL (ref 0.3–1)
MONOCYTES NFR BLD: 6.7 % (ref 4–15)
NEUTROPHILS # BLD AUTO: 10 K/UL (ref 1.8–7.7)
NEUTROPHILS NFR BLD: 77 % (ref 38–73)
NRBC BLD-RTO: 0 /100 WBC
PLATELET # BLD AUTO: 338 K/UL (ref 150–450)
PMV BLD AUTO: 11.4 FL (ref 9.2–12.9)
RBC # BLD AUTO: 4.32 M/UL (ref 4–5.4)
TREPONEMA PALLIDUM IGG+IGM AB [PRESENCE] IN SERUM OR PLASMA BY IMMUNOASSAY: NONREACTIVE
WBC # BLD AUTO: 12.93 K/UL (ref 3.9–12.7)

## 2024-07-03 PROCEDURE — 99999 PR PBB SHADOW E&M-EST. PATIENT-LVL III: CPT | Mod: PBBFAC,,, | Performed by: OBSTETRICS & GYNECOLOGY

## 2024-07-03 PROCEDURE — 85025 COMPLETE CBC W/AUTO DIFF WBC: CPT | Performed by: OBSTETRICS & GYNECOLOGY

## 2024-07-03 PROCEDURE — 36415 COLL VENOUS BLD VENIPUNCTURE: CPT | Performed by: OBSTETRICS & GYNECOLOGY

## 2024-07-03 PROCEDURE — 86593 SYPHILIS TEST NON-TREP QUANT: CPT | Performed by: OBSTETRICS & GYNECOLOGY

## 2024-07-03 PROCEDURE — 0502F SUBSEQUENT PRENATAL CARE: CPT | Mod: CPTII,S$GLB,, | Performed by: OBSTETRICS & GYNECOLOGY

## 2024-07-03 PROCEDURE — 87389 HIV-1 AG W/HIV-1&-2 AB AG IA: CPT | Performed by: OBSTETRICS & GYNECOLOGY

## 2024-07-03 PROCEDURE — 87081 CULTURE SCREEN ONLY: CPT | Performed by: OBSTETRICS & GYNECOLOGY

## 2024-07-03 NOTE — PROGRESS NOTES
35w5d  No complaints. Denies vaginal bleeding, abnormal discharge or pelvic pain. Good fetal movements with kick counts met daily.  Occasional mild cramping only. No contractions.  AMA: ASA. NIPT negative Normal anatomy. 32 week growth 42%/AC 74%  GBS done today. Labs planned with next visit.  RTC 1 week

## 2024-07-05 LAB — BACTERIA SPEC AEROBE CULT: NORMAL

## 2024-07-08 ENCOUNTER — LAB VISIT (OUTPATIENT)
Dept: LAB | Facility: OTHER | Age: 39
End: 2024-07-08
Attending: OBSTETRICS & GYNECOLOGY
Payer: COMMERCIAL

## 2024-07-08 ENCOUNTER — TELEPHONE (OUTPATIENT)
Dept: OBSTETRICS AND GYNECOLOGY | Facility: CLINIC | Age: 39
End: 2024-07-08
Payer: COMMERCIAL

## 2024-07-08 ENCOUNTER — ROUTINE PRENATAL (OUTPATIENT)
Dept: OBSTETRICS AND GYNECOLOGY | Facility: CLINIC | Age: 39
End: 2024-07-08
Payer: COMMERCIAL

## 2024-07-08 VITALS
WEIGHT: 213.88 LBS | DIASTOLIC BLOOD PRESSURE: 80 MMHG | BODY MASS INDEX: 33.49 KG/M2 | SYSTOLIC BLOOD PRESSURE: 140 MMHG

## 2024-07-08 DIAGNOSIS — O13.3 GESTATIONAL HYPERTENSION, THIRD TRIMESTER: ICD-10-CM

## 2024-07-08 DIAGNOSIS — Z3A.36 36 WEEKS GESTATION OF PREGNANCY: ICD-10-CM

## 2024-07-08 DIAGNOSIS — O09.523 MULTIGRAVIDA OF ADVANCED MATERNAL AGE IN THIRD TRIMESTER: ICD-10-CM

## 2024-07-08 DIAGNOSIS — O13.3 GESTATIONAL HYPERTENSION, THIRD TRIMESTER: Primary | ICD-10-CM

## 2024-07-08 DIAGNOSIS — O09.523 MULTIGRAVIDA OF ADVANCED MATERNAL AGE IN THIRD TRIMESTER: Primary | ICD-10-CM

## 2024-07-08 LAB
ALBUMIN SERPL BCP-MCNC: 3 G/DL (ref 3.5–5.2)
ALP SERPL-CCNC: 122 U/L (ref 55–135)
ALT SERPL W/O P-5'-P-CCNC: 19 U/L (ref 10–44)
ANION GAP SERPL CALC-SCNC: 9 MMOL/L (ref 8–16)
AST SERPL-CCNC: 18 U/L (ref 10–40)
BASOPHILS # BLD AUTO: 0.03 K/UL (ref 0–0.2)
BASOPHILS NFR BLD: 0.2 % (ref 0–1.9)
BILIRUB SERPL-MCNC: 0.3 MG/DL (ref 0.1–1)
BUN SERPL-MCNC: 8 MG/DL (ref 6–20)
CALCIUM SERPL-MCNC: 9.3 MG/DL (ref 8.7–10.5)
CHLORIDE SERPL-SCNC: 108 MMOL/L (ref 95–110)
CO2 SERPL-SCNC: 20 MMOL/L (ref 23–29)
CREAT SERPL-MCNC: 0.7 MG/DL (ref 0.5–1.4)
CREAT UR-MCNC: 22 MG/DL (ref 15–325)
DIFFERENTIAL METHOD BLD: ABNORMAL
EOSINOPHIL # BLD AUTO: 0.1 K/UL (ref 0–0.5)
EOSINOPHIL NFR BLD: 1 % (ref 0–8)
ERYTHROCYTE [DISTWIDTH] IN BLOOD BY AUTOMATED COUNT: 12.6 % (ref 11.5–14.5)
EST. GFR  (NO RACE VARIABLE): >60 ML/MIN/1.73 M^2
GLUCOSE SERPL-MCNC: 110 MG/DL (ref 70–110)
HCT VFR BLD AUTO: 38.6 % (ref 37–48.5)
HGB BLD-MCNC: 12.8 G/DL (ref 12–16)
IMM GRANULOCYTES # BLD AUTO: 0.11 K/UL (ref 0–0.04)
IMM GRANULOCYTES NFR BLD AUTO: 0.9 % (ref 0–0.5)
LYMPHOCYTES # BLD AUTO: 1.7 K/UL (ref 1–4.8)
LYMPHOCYTES NFR BLD: 13.9 % (ref 18–48)
MCH RBC QN AUTO: 29.6 PG (ref 27–31)
MCHC RBC AUTO-ENTMCNC: 33.2 G/DL (ref 32–36)
MCV RBC AUTO: 89 FL (ref 82–98)
MONOCYTES # BLD AUTO: 0.8 K/UL (ref 0.3–1)
MONOCYTES NFR BLD: 6.6 % (ref 4–15)
NEUTROPHILS # BLD AUTO: 9.3 K/UL (ref 1.8–7.7)
NEUTROPHILS NFR BLD: 77.4 % (ref 38–73)
NRBC BLD-RTO: 0 /100 WBC
PLATELET # BLD AUTO: 316 K/UL (ref 150–450)
PMV BLD AUTO: 10.2 FL (ref 9.2–12.9)
POTASSIUM SERPL-SCNC: 4.1 MMOL/L (ref 3.5–5.1)
PROT SERPL-MCNC: 7 G/DL (ref 6–8.4)
PROT UR-MCNC: <7 MG/DL (ref 0–15)
PROT/CREAT UR: NORMAL MG/G{CREAT} (ref 0–0.2)
RBC # BLD AUTO: 4.32 M/UL (ref 4–5.4)
SODIUM SERPL-SCNC: 137 MMOL/L (ref 136–145)
WBC # BLD AUTO: 12.04 K/UL (ref 3.9–12.7)

## 2024-07-08 PROCEDURE — 80053 COMPREHEN METABOLIC PANEL: CPT | Performed by: OBSTETRICS & GYNECOLOGY

## 2024-07-08 PROCEDURE — 99999 PR PBB SHADOW E&M-EST. PATIENT-LVL III: CPT | Mod: PBBFAC,,, | Performed by: OBSTETRICS & GYNECOLOGY

## 2024-07-08 PROCEDURE — 36415 COLL VENOUS BLD VENIPUNCTURE: CPT | Performed by: OBSTETRICS & GYNECOLOGY

## 2024-07-08 PROCEDURE — 85025 COMPLETE CBC W/AUTO DIFF WBC: CPT | Performed by: OBSTETRICS & GYNECOLOGY

## 2024-07-08 PROCEDURE — 84156 ASSAY OF PROTEIN URINE: CPT | Performed by: OBSTETRICS & GYNECOLOGY

## 2024-07-08 PROCEDURE — 0502F SUBSEQUENT PRENATAL CARE: CPT | Mod: CPTII,S$GLB,, | Performed by: OBSTETRICS & GYNECOLOGY

## 2024-07-08 NOTE — PROGRESS NOTES
36w3d  No complaints. Denies vaginal bleeding, abnormal discharge or pelvic pain. Good fetal movements with kick counts met daily.  Occasional mild cramping only. No contractions.  Initial BP last visit mildly elevated and asymptomatic. Repeat BP normal that day. Had patient return today for BP check and again elevated at 140 systolic. Discussed diagnosis of GHTN and recommendation for delivery at [id number]. Denies headache, blurry vision or RUQ pain. Pre E labs today and BPP/growth tomorrow. If no proteinuria, then will do twice weekly monitoring with IOL 7/18/24 at 4pm. PreE precautions reviewed.  AMA: ASA. NIPT negative Normal anatomy. 32 week growth 42%/AC 74%  GBS negative. Normal 3TM labs.  Labor precautions reviewed.  RTC 1 week

## 2024-07-09 ENCOUNTER — PROCEDURE VISIT (OUTPATIENT)
Dept: OBSTETRICS AND GYNECOLOGY | Facility: CLINIC | Age: 39
End: 2024-07-09
Payer: COMMERCIAL

## 2024-07-09 DIAGNOSIS — O13.3 GESTATIONAL HYPERTENSION, THIRD TRIMESTER: ICD-10-CM

## 2024-07-09 DIAGNOSIS — O13.3 GESTATIONAL HYPERTENSION, THIRD TRIMESTER: Primary | ICD-10-CM

## 2024-07-09 PROCEDURE — 76816 OB US FOLLOW-UP PER FETUS: CPT | Mod: S$GLB,,, | Performed by: OBSTETRICS & GYNECOLOGY

## 2024-07-09 PROCEDURE — 76819 FETAL BIOPHYS PROFIL W/O NST: CPT | Mod: S$GLB,,, | Performed by: OBSTETRICS & GYNECOLOGY

## 2024-07-09 NOTE — PROGRESS NOTES
Please schedule patient for NST on Friday at [facility] and BPP 11:15 on 7/16- I know her appt is for 11 with me but will see her after the ultrasound.

## 2024-07-09 NOTE — TELEPHONE ENCOUNTER
Induction for 7/31 canceled.   Induction requested for 7/18 at 1600.  Just fyi though there are multiple requests in for 7/18 at 1600.  So let's see what Saray can do.

## 2024-07-10 ENCOUNTER — CLINICAL SUPPORT (OUTPATIENT)
Dept: REHABILITATION | Facility: HOSPITAL | Age: 39
End: 2024-07-10
Payer: COMMERCIAL

## 2024-07-10 DIAGNOSIS — O26.893 PREGNANCY RELATED HIP PAIN IN THIRD TRIMESTER, ANTEPARTUM: Primary | ICD-10-CM

## 2024-07-10 DIAGNOSIS — M25.559 PREGNANCY RELATED HIP PAIN IN THIRD TRIMESTER, ANTEPARTUM: Primary | ICD-10-CM

## 2024-07-10 DIAGNOSIS — R29.898 DECREASED STRENGTH OF LOWER EXTREMITY: ICD-10-CM

## 2024-07-10 PROCEDURE — 97110 THERAPEUTIC EXERCISES: CPT

## 2024-07-10 PROCEDURE — 97530 THERAPEUTIC ACTIVITIES: CPT

## 2024-07-10 PROCEDURE — 97140 MANUAL THERAPY 1/> REGIONS: CPT

## 2024-07-12 ENCOUNTER — HOSPITAL ENCOUNTER (OUTPATIENT)
Dept: PERINATAL CARE | Facility: OTHER | Age: 39
Discharge: HOME | End: 2024-07-12
Attending: OBSTETRICS & GYNECOLOGY
Payer: COMMERCIAL

## 2024-07-12 DIAGNOSIS — O13.3 GESTATIONAL HYPERTENSION, THIRD TRIMESTER: ICD-10-CM

## 2024-07-12 PROCEDURE — 59025 FETAL NON-STRESS TEST: CPT | Mod: 26,,, | Performed by: OBSTETRICS & GYNECOLOGY

## 2024-07-16 ENCOUNTER — ROUTINE PRENATAL (OUTPATIENT)
Dept: OBSTETRICS AND GYNECOLOGY | Facility: CLINIC | Age: 39
End: 2024-07-16
Payer: COMMERCIAL

## 2024-07-16 ENCOUNTER — PROCEDURE VISIT (OUTPATIENT)
Dept: OBSTETRICS AND GYNECOLOGY | Facility: CLINIC | Age: 39
End: 2024-07-16
Payer: COMMERCIAL

## 2024-07-16 ENCOUNTER — TELEPHONE (OUTPATIENT)
Dept: OBSTETRICS AND GYNECOLOGY | Facility: CLINIC | Age: 39
End: 2024-07-16

## 2024-07-16 VITALS
BODY MASS INDEX: 33.87 KG/M2 | DIASTOLIC BLOOD PRESSURE: 80 MMHG | SYSTOLIC BLOOD PRESSURE: 132 MMHG | WEIGHT: 216.25 LBS

## 2024-07-16 DIAGNOSIS — Z87.81 HISTORY OF HIP FRACTURE: ICD-10-CM

## 2024-07-16 DIAGNOSIS — Z3A.37 37 WEEKS GESTATION OF PREGNANCY: ICD-10-CM

## 2024-07-16 DIAGNOSIS — O13.3 GESTATIONAL HYPERTENSION, THIRD TRIMESTER: Primary | ICD-10-CM

## 2024-07-16 DIAGNOSIS — O13.3 GESTATIONAL HYPERTENSION, THIRD TRIMESTER: ICD-10-CM

## 2024-07-16 DIAGNOSIS — O09.523 MULTIGRAVIDA OF ADVANCED MATERNAL AGE IN THIRD TRIMESTER: ICD-10-CM

## 2024-07-16 PROCEDURE — 0502F SUBSEQUENT PRENATAL CARE: CPT | Mod: CPTII,S$GLB,, | Performed by: OBSTETRICS & GYNECOLOGY

## 2024-07-16 PROCEDURE — 76819 FETAL BIOPHYS PROFIL W/O NST: CPT | Mod: S$GLB,,, | Performed by: OBSTETRICS & GYNECOLOGY

## 2024-07-16 PROCEDURE — 99999 PR PBB SHADOW E&M-EST. PATIENT-LVL III: CPT | Mod: PBBFAC,,, | Performed by: OBSTETRICS & GYNECOLOGY

## 2024-07-16 NOTE — TELEPHONE ENCOUNTER
Called pt in regards to scheduling urine lab  Order is placed within pt chart  No answer  Lvm and call back number    ND

## 2024-07-16 NOTE — PROGRESS NOTES
37w4d  No complaints. Denies vaginal bleeding, abnormal discharge or pelvic pain. Good fetal movements with kick counts met daily.  Occasional mild cramping only.   GHTN: Normal BP today. Denies headache, blurry vision or RUQ pain. Pre E labs normal. Twice weekly monitoring has been normal. Continue until delivery. IOL 7/18/24 at 4pm. PreE precautions reviewed.  AMA: ASA. NIPT negative Normal anatomy. 32 week growth 42%/AC 74%  SVE: C/T/H  Labor precautions reviewed.  RTC 1 week

## 2024-07-17 ENCOUNTER — LAB VISIT (OUTPATIENT)
Dept: LAB | Facility: OTHER | Age: 39
End: 2024-07-17
Attending: OBSTETRICS & GYNECOLOGY
Payer: COMMERCIAL

## 2024-07-17 ENCOUNTER — CLINICAL SUPPORT (OUTPATIENT)
Dept: REHABILITATION | Facility: HOSPITAL | Age: 39
End: 2024-07-17
Payer: COMMERCIAL

## 2024-07-17 DIAGNOSIS — O26.893 PREGNANCY RELATED HIP PAIN IN THIRD TRIMESTER, ANTEPARTUM: Primary | ICD-10-CM

## 2024-07-17 DIAGNOSIS — R29.898 DECREASED STRENGTH OF LOWER EXTREMITY: ICD-10-CM

## 2024-07-17 DIAGNOSIS — M25.559 PREGNANCY RELATED HIP PAIN IN THIRD TRIMESTER, ANTEPARTUM: Primary | ICD-10-CM

## 2024-07-17 DIAGNOSIS — O13.3 GESTATIONAL HYPERTENSION, THIRD TRIMESTER: ICD-10-CM

## 2024-07-17 LAB
CREAT UR-MCNC: 19.6 MG/DL (ref 15–325)
PROT UR-MCNC: <7 MG/DL (ref 0–15)
PROT/CREAT UR: NORMAL MG/G{CREAT} (ref 0–0.2)

## 2024-07-17 PROCEDURE — 97530 THERAPEUTIC ACTIVITIES: CPT

## 2024-07-17 PROCEDURE — 97112 NEUROMUSCULAR REEDUCATION: CPT

## 2024-07-17 PROCEDURE — 84156 ASSAY OF PROTEIN URINE: CPT | Performed by: OBSTETRICS & GYNECOLOGY

## 2024-07-17 PROCEDURE — 97110 THERAPEUTIC EXERCISES: CPT

## 2024-07-17 NOTE — PROGRESS NOTES
"[facility]   Physical Therapy Discharge and Treatment Note      Name: Arianna [facility]  Clinic Number: [id number]    Therapy Diagnosis:   Encounter Diagnoses   Name Primary?    Pregnancy related hip pain in third trimester, antepartum Yes    Decreased strength of lower extremity          Physician: Milady Jameson NP    Visit Date: 7/17/2024       Physician Orders: PT Eval and Treat   Medical Diagnosis from Referral:   O26.893,M25.559 (ICD-10-CM) - Pregnancy related hip pain in third trimester, antepartum   Z87.81 (ICD-10-CM) - History of hip fracture   Evaluation Date: 5/21/2024  Authorization Period Expiration: 12/31/24  Plan of Care Expiration: 8/21/2024  Progress Note Due: 6/21/2024  Visit # / Visits authorized: 1/ 1, 13/20  FOTO: 1/3     Precautions: Standard and pregnant and HTN     PTA Visit #: 0/5     Time In: 10:45am  Time Out: 11:31am  Total Billable Time: 46 minutes    Subjective     Pt reports:  She has been working on hip internal rotation stuff at home and has been doing well. She will be induced on Friday this week.   She was compliant with home exercise program.  Response to previous treatment: none stated   Functional change: progressing    Pain:  0/10  Location: n/a    Objective      Objective Measures updated at progress report unless specified.         Treatment     Arianna received the treatments listed below:      neuromuscular re-education activities to improve: Coordination, Proprioception, Posture, and Motor Control for 08 minutes. The following activities were included:    PPT with ring abd  - 2x10   PPT with Ball squeezes   - 2x10   Wall clams 3" 10x B     NT:  SL clam 1x1' B  SL hip abd 1x1' B  Quadruped fire hydrant isometric 1x1' B    manual therapy techniques:  were applied to the: right sided low back for 22 minutes, including:  Gr 2 lateral distraction of B hips  Cupping   STM R paraspinals     therapeutic activities to improve functional performance for " 8 minutes, including:    A/P pelvic tilts c/ SB x10 ea  Pelvic Circles c/ SB CW/CCW x10 ea   Alt standing march c/ GTB @ feet 2x10 BLE - NT  Standing hip circles x20 each way B - NT       Patient Education and Home Exercises       Education provided:   - HEP    Written Home Exercises Provided: yes. Exercises were reviewed and Arianna was able to demonstrate them prior to the end of the session.  Arianna demonstrated good  understanding of the education provided. See EMR under Patient Instructions for exercises provided during therapy sessions    Assessment     Pt has progressed well with therapy and has shown improvements in hip mobility and hip pain. Pt to be discharged from PT services at this time as she will be giving birth in two days. Discussed options for pelvic health therapy in the future and pt plans to establish care with a pelvic health therapist following the delivery. Pt to be discharged from skilled PT services at this time. All questions answered and pt agreeable to POC.       Pt's spiritual, cultural and educational needs considered and pt agreeable to plan of care and goals.     Anticipated barriers to physical therapy: none    Goals:   Short Term Goals (4 Weeks):   1. Pt will be compliant with HEP to supplement PT in restoring pain free function.  2. Pt will improve impaired LE MMTs by 1/2 grade  to improve strength for functional tasks  3. Pt improve impaired hip IR ROM by 5 deg to improve mobility for normal movement patterns.   Long Term Goals (8 Weeks):  1. Pt will improve FOTO score to </= 69% limited to decrease perceived limitation with mobility  2.  Pt will improve impaired LE MMTs by 1 grade to improve strength for functional tasks.  4. Pt improve impaired hip ROM to WNL in all planes to improve mobility for normal movement patterns.     Plan     Pt to be discharged from outpatient physical therapy services with HEP at this time.     Gaston Carvalho, PT

## 2024-07-18 ENCOUNTER — PATIENT MESSAGE (OUTPATIENT)
Dept: OBSTETRICS AND GYNECOLOGY | Facility: OTHER | Age: 39
End: 2024-07-18
Payer: COMMERCIAL

## 2024-07-19 ENCOUNTER — HOSPITAL ENCOUNTER (INPATIENT)
Facility: OTHER | Age: 39
LOS: 3 days | Discharge: HOME | End: 2024-07-22
Attending: OBSTETRICS & GYNECOLOGY | Admitting: OBSTETRICS & GYNECOLOGY
Payer: COMMERCIAL

## 2024-07-19 DIAGNOSIS — O13.3 GESTATIONAL HYPERTENSION, THIRD TRIMESTER: ICD-10-CM

## 2024-07-19 DIAGNOSIS — O09.523 MULTIGRAVIDA OF ADVANCED MATERNAL AGE IN THIRD TRIMESTER: ICD-10-CM

## 2024-07-19 DIAGNOSIS — Z34.90 ENCOUNTER FOR INDUCTION OF LABOR: ICD-10-CM

## 2024-07-19 LAB
ABO + RH BLD: NORMAL
ALBUMIN SERPL BCP-MCNC: 2.9 G/DL (ref 3.5–5.2)
ALP SERPL-CCNC: 117 U/L (ref 55–135)
ALT SERPL W/O P-5'-P-CCNC: 17 U/L (ref 10–44)
ANION GAP SERPL CALC-SCNC: 11 MMOL/L (ref 8–16)
AST SERPL-CCNC: 18 U/L (ref 10–40)
BASOPHILS # BLD AUTO: 0.05 K/UL (ref 0–0.2)
BASOPHILS NFR BLD: 0.4 % (ref 0–1.9)
BILIRUB SERPL-MCNC: 0.3 MG/DL (ref 0.1–1)
BLD GP AB SCN CELLS X3 SERPL QL: NORMAL
BUN SERPL-MCNC: 9 MG/DL (ref 6–20)
CALCIUM SERPL-MCNC: 9.1 MG/DL (ref 8.7–10.5)
CHLORIDE SERPL-SCNC: 111 MMOL/L (ref 95–110)
CO2 SERPL-SCNC: 16 MMOL/L (ref 23–29)
CREAT SERPL-MCNC: 0.7 MG/DL (ref 0.5–1.4)
CREAT UR-MCNC: 29.6 MG/DL (ref 15–325)
DIFFERENTIAL METHOD BLD: ABNORMAL
EOSINOPHIL # BLD AUTO: 0.2 K/UL (ref 0–0.5)
EOSINOPHIL NFR BLD: 1.3 % (ref 0–8)
ERYTHROCYTE [DISTWIDTH] IN BLOOD BY AUTOMATED COUNT: 13 % (ref 11.5–14.5)
EST. GFR  (NO RACE VARIABLE): >60 ML/MIN/1.73 M^2
GLUCOSE SERPL-MCNC: 91 MG/DL (ref 70–110)
HCT VFR BLD AUTO: 36.2 % (ref 37–48.5)
HGB BLD-MCNC: 12.2 G/DL (ref 12–16)
IMM GRANULOCYTES # BLD AUTO: 0.1 K/UL (ref 0–0.04)
IMM GRANULOCYTES NFR BLD AUTO: 0.8 % (ref 0–0.5)
LYMPHOCYTES # BLD AUTO: 1.8 K/UL (ref 1–4.8)
LYMPHOCYTES NFR BLD: 14.6 % (ref 18–48)
MCH RBC QN AUTO: 29.4 PG (ref 27–31)
MCHC RBC AUTO-ENTMCNC: 33.7 G/DL (ref 32–36)
MCV RBC AUTO: 87 FL (ref 82–98)
MONOCYTES # BLD AUTO: 0.9 K/UL (ref 0.3–1)
MONOCYTES NFR BLD: 7.4 % (ref 4–15)
NEUTROPHILS # BLD AUTO: 9.3 K/UL (ref 1.8–7.7)
NEUTROPHILS NFR BLD: 75.5 % (ref 38–73)
NRBC BLD-RTO: 0 /100 WBC
PLATELET # BLD AUTO: 333 K/UL (ref 150–450)
PMV BLD AUTO: 10.9 FL (ref 9.2–12.9)
POTASSIUM SERPL-SCNC: 3.6 MMOL/L (ref 3.5–5.1)
PROT SERPL-MCNC: 6.7 G/DL (ref 6–8.4)
PROT UR-MCNC: <7 MG/DL (ref 0–15)
PROT/CREAT UR: NORMAL MG/G{CREAT} (ref 0–0.2)
RBC # BLD AUTO: 4.15 M/UL (ref 4–5.4)
SODIUM SERPL-SCNC: 138 MMOL/L (ref 136–145)
SPECIMEN OUTDATE: NORMAL
TREPONEMA PALLIDUM IGG+IGM AB [PRESENCE] IN SERUM OR PLASMA BY IMMUNOASSAY: NONREACTIVE
WBC # BLD AUTO: 12.3 K/UL (ref 3.9–12.7)

## 2024-07-19 PROCEDURE — 25000003 PHARM REV CODE 250

## 2024-07-19 PROCEDURE — 82570 ASSAY OF URINE CREATININE: CPT

## 2024-07-19 PROCEDURE — 85025 COMPLETE CBC W/AUTO DIFF WBC: CPT

## 2024-07-19 PROCEDURE — 11000001 HC ACUTE MED/SURG PRIVATE ROOM

## 2024-07-19 PROCEDURE — 86850 RBC ANTIBODY SCREEN: CPT

## 2024-07-19 PROCEDURE — 59200 INSERT CERVICAL DILATOR: CPT

## 2024-07-19 PROCEDURE — 63600175 PHARM REV CODE 636 W HCPCS

## 2024-07-19 PROCEDURE — 86593 SYPHILIS TEST NON-TREP QUANT: CPT | Performed by: OBSTETRICS & GYNECOLOGY

## 2024-07-19 PROCEDURE — 84156 ASSAY OF PROTEIN URINE: CPT

## 2024-07-19 PROCEDURE — 72100002 HC LABOR CARE, 1ST 8 HOURS

## 2024-07-19 PROCEDURE — 80053 COMPREHEN METABOLIC PANEL: CPT

## 2024-07-19 PROCEDURE — 86900 BLOOD TYPING SEROLOGIC ABO: CPT

## 2024-07-19 RX ORDER — ONDANSETRON 8 MG/1
8 TABLET, ORALLY DISINTEGRATING ORAL EVERY 8 HOURS PRN
Status: DISCONTINUED | OUTPATIENT
Start: 2024-07-19 | End: 2024-07-20

## 2024-07-19 RX ORDER — SODIUM CHLORIDE, SODIUM LACTATE, POTASSIUM CHLORIDE, CALCIUM CHLORIDE 600; 310; 30; 20 MG/100ML; MG/100ML; MG/100ML; MG/100ML
INJECTION, SOLUTION INTRAVENOUS CONTINUOUS
Status: DISCONTINUED | OUTPATIENT
Start: 2024-07-19 | End: 2024-07-20

## 2024-07-19 RX ORDER — CARBOPROST TROMETHAMINE 250 UG/ML
250 INJECTION, SOLUTION INTRAMUSCULAR
Status: DISCONTINUED | OUTPATIENT
Start: 2024-07-19 | End: 2024-07-20

## 2024-07-19 RX ORDER — OXYTOCIN-SODIUM CHLORIDE 0.9% IV SOLN 30 UNIT/500ML 30-0.9/5 UT/ML-%
10 SOLUTION INTRAVENOUS ONCE AS NEEDED
Status: COMPLETED | OUTPATIENT
Start: 2024-07-19 | End: 2024-07-20

## 2024-07-19 RX ORDER — DIPHENOXYLATE HYDROCHLORIDE AND ATROPINE SULFATE 2.5; .025 MG/1; MG/1
2 TABLET ORAL EVERY 6 HOURS PRN
Status: DISCONTINUED | OUTPATIENT
Start: 2024-07-19 | End: 2024-07-20

## 2024-07-19 RX ORDER — MISOPROSTOL 200 UG/1
800 TABLET ORAL ONCE AS NEEDED
Status: COMPLETED | OUTPATIENT
Start: 2024-07-19 | End: 2024-07-20

## 2024-07-19 RX ORDER — TRANEXAMIC ACID 10 MG/ML
1000 INJECTION, SOLUTION INTRAVENOUS EVERY 30 MIN PRN
Status: DISCONTINUED | OUTPATIENT
Start: 2024-07-19 | End: 2024-07-20

## 2024-07-19 RX ORDER — METHYLERGONOVINE MALEATE 0.2 MG/ML
200 INJECTION INTRAVENOUS ONCE AS NEEDED
Status: DISCONTINUED | OUTPATIENT
Start: 2024-07-19 | End: 2024-07-20

## 2024-07-19 RX ORDER — OXYTOCIN 10 [USP'U]/ML
10 INJECTION, SOLUTION INTRAMUSCULAR; INTRAVENOUS ONCE AS NEEDED
Status: DISCONTINUED | OUTPATIENT
Start: 2024-07-19 | End: 2024-07-20

## 2024-07-19 RX ORDER — OXYTOCIN-SODIUM CHLORIDE 0.9% IV SOLN 30 UNIT/500ML 30-0.9/5 UT/ML-%
95 SOLUTION INTRAVENOUS ONCE
Status: DISCONTINUED | OUTPATIENT
Start: 2024-07-19 | End: 2024-07-20

## 2024-07-19 RX ORDER — CALCIUM CARBONATE 200(500)MG
500 TABLET,CHEWABLE ORAL 3 TIMES DAILY PRN
Status: DISCONTINUED | OUTPATIENT
Start: 2024-07-19 | End: 2024-07-20

## 2024-07-19 RX ORDER — OXYTOCIN-SODIUM CHLORIDE 0.9% IV SOLN 30 UNIT/500ML 30-0.9/5 UT/ML-%
0-32 SOLUTION INTRAVENOUS CONTINUOUS
Status: DISCONTINUED | OUTPATIENT
Start: 2024-07-19 | End: 2024-07-20

## 2024-07-19 RX ORDER — MISOPROSTOL 200 UG/1
800 TABLET ORAL ONCE AS NEEDED
Status: DISCONTINUED | OUTPATIENT
Start: 2024-07-19 | End: 2024-07-20

## 2024-07-19 RX ORDER — LIDOCAINE HYDROCHLORIDE 10 MG/ML
10 INJECTION INFILTRATION; PERINEURAL ONCE AS NEEDED
Status: DISCONTINUED | OUTPATIENT
Start: 2024-07-19 | End: 2024-07-20

## 2024-07-19 RX ORDER — PANTOPRAZOLE SODIUM 40 MG/1
40 TABLET, DELAYED RELEASE ORAL DAILY
Status: DISCONTINUED | OUTPATIENT
Start: 2024-07-20 | End: 2024-07-20

## 2024-07-19 RX ORDER — ACETAMINOPHEN 500 MG
1000 TABLET ORAL ONCE
Status: COMPLETED | OUTPATIENT
Start: 2024-07-19 | End: 2024-07-19

## 2024-07-19 RX ORDER — OXYTOCIN-SODIUM CHLORIDE 0.9% IV SOLN 30 UNIT/500ML 30-0.9/5 UT/ML-%
10 SOLUTION INTRAVENOUS ONCE
Status: DISCONTINUED | OUTPATIENT
Start: 2024-07-19 | End: 2024-07-20

## 2024-07-19 RX ORDER — OXYTOCIN-SODIUM CHLORIDE 0.9% IV SOLN 30 UNIT/500ML 30-0.9/5 UT/ML-%
95 SOLUTION INTRAVENOUS ONCE AS NEEDED
Status: DISCONTINUED | OUTPATIENT
Start: 2024-07-19 | End: 2024-07-20

## 2024-07-19 RX ORDER — SIMETHICONE 80 MG
1 TABLET,CHEWABLE ORAL 4 TIMES DAILY PRN
Status: DISCONTINUED | OUTPATIENT
Start: 2024-07-19 | End: 2024-07-20

## 2024-07-19 RX ORDER — SODIUM CHLORIDE 9 MG/ML
INJECTION, SOLUTION INTRAVENOUS
Status: DISCONTINUED | OUTPATIENT
Start: 2024-07-19 | End: 2024-07-20

## 2024-07-19 RX ADMIN — SODIUM CHLORIDE, POTASSIUM CHLORIDE, SODIUM LACTATE AND CALCIUM CHLORIDE: 600; 310; 30; 20 INJECTION, SOLUTION INTRAVENOUS at 05:07

## 2024-07-19 RX ADMIN — ONDANSETRON 8 MG: 8 TABLET, ORALLY DISINTEGRATING ORAL at 10:07

## 2024-07-19 RX ADMIN — ONDANSETRON 8 MG: 8 TABLET, ORALLY DISINTEGRATING ORAL at 03:07

## 2024-07-19 RX ADMIN — MISOPROSTOL 50 MCG: 100 TABLET ORAL at 06:07

## 2024-07-19 RX ADMIN — ACETAMINOPHEN 1000 MG: 500 TABLET ORAL at 11:07

## 2024-07-19 RX ADMIN — SODIUM CHLORIDE, POTASSIUM CHLORIDE, SODIUM LACTATE AND CALCIUM CHLORIDE: 600; 310; 30; 20 INJECTION, SOLUTION INTRAVENOUS at 09:07

## 2024-07-19 RX ADMIN — MISOPROSTOL 50 MCG: 100 TABLET ORAL at 10:07

## 2024-07-19 RX ADMIN — Medication 4 MILLI-UNITS/MIN: at 04:07

## 2024-07-19 NOTE — PLAN OF CARE
Problem: Adult Inpatient Plan of Care  Goal: Plan of Care Review  Outcome: Progressing  Goal: Patient-Specific Goal (Individualized)  Outcome: Progressing  Goal: Absence of Hospital-Acquired Illness or Injury  Outcome: Progressing  Goal: Optimal Comfort and Wellbeing  Outcome: Progressing  Goal: Readiness for Transition of Care  Outcome: Progressing     Problem:  Fall Injury Risk  Goal: Absence of Fall, Infant Drop and Related Injury  Outcome: Progressing     Problem: Infection  Goal: Absence of Infection Signs and Symptoms  Outcome: Progressing     Problem: Labor  Goal: Hemostasis  Outcome: Progressing  Goal: Stable Fetal Wellbeing  Outcome: Progressing  Goal: Effective Progression to Delivery  Outcome: Progressing  Goal: Absence of Infection Signs and Symptoms  Outcome: Progressing  Goal: Acceptable Pain Control  Outcome: Progressing  Goal: Normal Uterine Contraction Pattern  Outcome: Progressing     Problem: Pain Acute  Goal: Optimal Pain Control and Function  Outcome: Progressing

## 2024-07-19 NOTE — CARE UPDATE
Labor and Delivery Care Update    Patient with unsustained severe blood pressures while patient out of bed bouncing on birthing ball. Blood pressures in between were normal or mild range. Conversation had with nursing staff about taking blood pressures only while at rest in bed. Patient is ok to use wireless fetal monitor but remain checking blood pressures q30-60 min in bed. Will continue to monitor for severe range blood pressures.    Leti Browning MD  OB/GYN PGY-1

## 2024-07-19 NOTE — PROGRESS NOTES
LABOR NOTE    S:  Complaints: No.  Epidural working:  not applicable  Resident to bedside to attempt vazquez placement    O: BP (!) 140/83   Pulse 61   Temp 98 °F (36.7 °C) (Oral)   Resp 18   SpO2 98%   Breastfeeding No     FHT: 135bpm; moderate variability; +accels/-decels; Cat 1 (reassuring)  CTX: irregular  SVE: 0.5/50/-3; vazquez placed    TIMELINE:  0630: cl/th/hi  0636: cytotec  0934: ft/50/-3; vazquez attempted however not successful  1055: cytotec  1145: ft/50/-3; vazquez placed;    PLAN:    Continue Close Maternal/Fetal Monitoring  Recheck 2-4 hours or PRN    Corrie Wiseman MD  Obstetrics and Gynecology - PGY2

## 2024-07-19 NOTE — PROGRESS NOTES
LABOR NOTE    S:  Complaints: No.  Epidural working:  not applicable  Resident to bedside to attempt vazquez placement    O: /72   Pulse 95   Temp 98 °F (36.7 °C) (Oral)   Resp 18   SpO2 97%   Breastfeeding No     FHT: 135bpm; moderate variability; +accels/-decels; Cat 1 (reassuring)  CTX: irregular  SVE: 0.5/50/-3    TIMELINE:  0630: cl/th/hi  0636: cytotec  0934: ft/50/-3; vazquez attempted however not successful    PLAN:    Continue Close Maternal/Fetal Monitoring  Plan for cytotec x2 at 1030  Recheck 2 hours or PRN    Corrie Wiseman MD  Obstetrics and Gynecology - PGY2

## 2024-07-19 NOTE — NURSING
Pt requested wireless monitoring. RN called MD to discuss pt's blood pressures/if wireless monitoring is OK at this time. Per Dr. Durham and Dr. Browning, wireless monitoring OK with BP checks Q hour.

## 2024-07-19 NOTE — PROGRESS NOTES
LABOR NOTE    S:  Complaints: No.  Epidural working:  not applicable  Resident to bedside for routine cervical check.    O: BP (!) 140/73   Pulse 84   Temp 97.8 °F (36.6 °C) (Oral)   Resp 16   SpO2 98%   Breastfeeding No     FHT: 140bpm; moderate variability; +accels/-decels; Cat 1 (reassuring)  CTX: q3min  SVE: 1/50/-3; vazquez in place    TIMELINE:  0630: cl/th/hi  0636: cytotec  0934: ft/50/-3; vazquez attempted however not successful  1055: cytotec  1145: ft/50/-3; vazquez placed  1600: 1/50/-3, vazquez in place, pit started    PLAN:  Pitocin augmentation per protocol  Continue Close Maternal/Fetal Monitoring  Recheck 2-4 hours or PRN   Likely 2/2 high doses of opioid medications  -unresponsive to Miralax + senna + bisacodyl  -Give 8mg Relistor today     -dosed q2d, next dose on 10/23/2022  -received Bisacodyl suppository last night with small BM, additional today with larger BM     -c/w Bisacodyl 10mg suppository qd PRN

## 2024-07-19 NOTE — H&P
HISTORY AND PHYSICAL                                                OBSTETRICS          Subjective:       Arianna Kraft is a 39 y.o.  female with IUP at 38w0d weeks gestation who presents with IOL 2/2 gHTN.    Patient denies contractions, denies vaginal bleeding, denies LOF.   Fetal Movement: normal.    This IUP is complicated by gHTN, AMA.    Review of Systems   Constitutional:  Negative for chills and fever.   Eyes:  Negative for visual disturbance.   Respiratory:  Negative for shortness of breath.    Cardiovascular:  Negative for chest pain.   Genitourinary:  Negative for vaginal bleeding and vaginal discharge.   Neurological:  Negative for headaches.       PMHx:   Past Medical History:   Diagnosis Date    Abnormal Pap smear of cervix        PSHx: No past surgical history on file.    All: Review of patient's allergies indicates:  No Known Allergies    Meds:   Medications Prior to Admission   Medication Sig Dispense Refill Last Dose    aspirin (ECOTRIN) 81 MG EC tablet Take 1 tablet (81 mg total) by mouth once daily.  0     doxylamine succinate (UNISOM, DOXYLAMINE, ORAL) Take by mouth.       omeprazole (PRILOSEC OTC) 20 MG tablet Take 20 mg by mouth once daily.       prenatal vit no.124/iron/folic (PRENATAL VITAMIN ORAL) Take by mouth.          SH:   Social History     Socioeconomic History    Marital status:    Tobacco Use    Smoking status: Never    Smokeless tobacco: Never   Substance and Sexual Activity    Alcohol use: Yes     Comment: socially    Drug use: Never    Sexual activity: Yes     Partners: Male     Birth control/protection: None       FH:   Family History   Problem Relation Name Age of Onset    Breast cancer Paternal Aunt      Colon cancer Neg Hx      Cancer Neg Hx      Diabetes Neg Hx      Eclampsia Neg Hx      Hypertension Neg Hx      Miscarriages / Stillbirths Neg Hx      Ovarian cancer Neg Hx       labor Neg Hx      Stroke Neg Hx         OBHx:   OB History     Para Term  AB Living   1 0 0 0 0 0   SAB IAB Ectopic Multiple Live Births   0 0 0 0 0      # Outcome Date GA Lbr Nikko/2nd Weight Sex Type Anes PTL Lv   1 Current               Obstetric Comments   Abnormal pap with HPV and negative since        Objective:       /81   Pulse 83   Temp 97.4 °F (36.3 °C) (Oral)   SpO2 98%     Vitals:    24 0623 24 0627 24 0631 24 0632   BP:       Pulse: 76 80 84 83   Temp:       TempSrc:       SpO2:  98%  98%       General:   alert, appears stated age and cooperative, no apparent distress   HENT:  normocephalic, atraumatic   Eyes:  extraocular movements and conjunctivae normal   Neck:  supple, range of motion normal, no thyromegaly   Lungs:   no respiratory distress   Heart:   regular rate   Abdomen:  soft, non-tender, non-distended but gravid, no rebound or guarding    Extremities negative edema, negative erythema   FHT: 160, moderate BTBV, +accels, -decels;  Cat 1 (reassuring)                 TOCO: Q 4-6 minutes   Presentations: cephalic by ultrasound   Cervix:     Dilation: Closed    Effacement: 50%    Station:  -3             EFW by [facility]opold[facility]: 6.5    Recent Growth Scan: EFW 3144g 36w4d    Lab Review  Blood Type O POS  GBBS: negative  Rubella: Immune  RPR: neg  HIV: negative  HepB: negative       Assessment:       38w0d weeks gestation with IOL 2/2 gHTN    Active Hospital Problems    Diagnosis  POA    *Encounter for induction of labor [Z34.90]  Not Applicable    Multigravida of advanced maternal age in third trimester [O09.523]  Yes      Resolved Hospital Problems   No resolved problems to display.          Plan:     IOL    Risks, benefits, alternatives and possible complications have been discussed in detail with the patient.   - Consents signed and to chart  - Admit to Labor and Delivery unit   - Epidural per Anesthesia  - Draw CBC, T&S  - Notify Staff  - One dose of cytotec given. Will attempt vazquez placement in 1 hour.   - Recheck in 4 hrs  or PRN  - EFW 3144g 36w4d      Post-Partum Hemorrhage risk - low    2. gHTN  -PC ratio unable to calculate, , AST/ALT 18/19, Cr 0.7    3. AMA   -negative Mat21      Nicole Crowley MD  Obstetrics & Gynecology, PGY-1

## 2024-07-20 ENCOUNTER — ANESTHESIA EVENT (OUTPATIENT)
Dept: OBSTETRICS AND GYNECOLOGY | Facility: OTHER | Age: 39
End: 2024-07-20
Payer: COMMERCIAL

## 2024-07-20 ENCOUNTER — ANESTHESIA (OUTPATIENT)
Dept: OBSTETRICS AND GYNECOLOGY | Facility: OTHER | Age: 39
End: 2024-07-20
Payer: COMMERCIAL

## 2024-07-20 PROBLEM — Z34.90 ENCOUNTER FOR INDUCTION OF LABOR: Status: RESOLVED | Noted: 2024-07-19 | Resolved: 2024-07-20

## 2024-07-20 PROCEDURE — 25000003 PHARM REV CODE 250: Performed by: OBSTETRICS & GYNECOLOGY

## 2024-07-20 PROCEDURE — C1751 CATH, INF, PER/CENT/MIDLINE: HCPCS | Performed by: ANESTHESIOLOGY

## 2024-07-20 PROCEDURE — 0KQM0ZZ REPAIR PERINEUM MUSCLE, OPEN APPROACH: ICD-10-PCS | Performed by: OBSTETRICS & GYNECOLOGY

## 2024-07-20 PROCEDURE — 10907ZC DRAINAGE OF AMNIOTIC FLUID, THERAPEUTIC FROM PRODUCTS OF CONCEPTION, VIA NATURAL OR ARTIFICIAL OPENING: ICD-10-PCS | Performed by: OBSTETRICS & GYNECOLOGY

## 2024-07-20 PROCEDURE — 51701 INSERT BLADDER CATHETER: CPT

## 2024-07-20 PROCEDURE — 25000003 PHARM REV CODE 250

## 2024-07-20 PROCEDURE — 3E0DXGC INTRODUCTION OF OTHER THERAPEUTIC SUBSTANCE INTO MOUTH AND PHARYNX, EXTERNAL APPROACH: ICD-10-PCS | Performed by: OBSTETRICS & GYNECOLOGY

## 2024-07-20 PROCEDURE — 62326 NJX INTERLAMINAR LMBR/SAC: CPT

## 2024-07-20 PROCEDURE — 63600175 PHARM REV CODE 636 W HCPCS: Mod: JG

## 2024-07-20 PROCEDURE — 72100003 HC LABOR CARE, EA. ADDL. 8 HRS

## 2024-07-20 PROCEDURE — 27200710 HC EPIDURAL INFUSION PUMP SET: Performed by: ANESTHESIOLOGY

## 2024-07-20 PROCEDURE — 63600175 PHARM REV CODE 636 W HCPCS: Performed by: OBSTETRICS & GYNECOLOGY

## 2024-07-20 PROCEDURE — 59400 OBSTETRICAL CARE: CPT | Mod: ,,, | Performed by: OBSTETRICS & GYNECOLOGY

## 2024-07-20 PROCEDURE — 63600175 PHARM REV CODE 636 W HCPCS

## 2024-07-20 PROCEDURE — 72200005 HC VAGINAL DELIVERY LEVEL II

## 2024-07-20 PROCEDURE — 11000001 HC ACUTE MED/SURG PRIVATE ROOM

## 2024-07-20 PROCEDURE — 59409 OBSTETRICAL CARE: CPT | Mod: ,,, | Performed by: ANESTHESIOLOGY

## 2024-07-20 PROCEDURE — 27000181 HC CABLE, IUPC

## 2024-07-20 PROCEDURE — 51702 INSERT TEMP BLADDER CATH: CPT

## 2024-07-20 RX ORDER — DEXTROSE, SODIUM CHLORIDE, SODIUM LACTATE, POTASSIUM CHLORIDE, AND CALCIUM CHLORIDE 5; .6; .31; .03; .02 G/100ML; G/100ML; G/100ML; G/100ML; G/100ML
INJECTION, SOLUTION INTRAVENOUS CONTINUOUS
Status: DISCONTINUED | OUTPATIENT
Start: 2024-07-20 | End: 2024-07-20

## 2024-07-20 RX ORDER — ONDANSETRON HYDROCHLORIDE 2 MG/ML
4 INJECTION, SOLUTION INTRAVENOUS ONCE
Status: DISCONTINUED | OUTPATIENT
Start: 2024-07-20 | End: 2024-07-20

## 2024-07-20 RX ORDER — PANTOPRAZOLE SODIUM 40 MG/1
40 TABLET, DELAYED RELEASE ORAL DAILY
Status: DISCONTINUED | OUTPATIENT
Start: 2024-07-20 | End: 2024-07-20

## 2024-07-20 RX ORDER — DIPHENHYDRAMINE HCL 25 MG
25 CAPSULE ORAL EVERY 4 HOURS PRN
Status: DISCONTINUED | OUTPATIENT
Start: 2024-07-20 | End: 2024-07-22 | Stop reason: HOSPADM

## 2024-07-20 RX ORDER — SIMETHICONE 80 MG
1 TABLET,CHEWABLE ORAL EVERY 6 HOURS PRN
Status: DISCONTINUED | OUTPATIENT
Start: 2024-07-20 | End: 2024-07-22 | Stop reason: HOSPADM

## 2024-07-20 RX ORDER — CARBOPROST TROMETHAMINE 250 UG/ML
250 INJECTION, SOLUTION INTRAMUSCULAR
Status: DISCONTINUED | OUTPATIENT
Start: 2024-07-20 | End: 2024-07-22 | Stop reason: HOSPADM

## 2024-07-20 RX ORDER — TRANEXAMIC ACID 10 MG/ML
1000 INJECTION, SOLUTION INTRAVENOUS EVERY 30 MIN PRN
Status: DISCONTINUED | OUTPATIENT
Start: 2024-07-20 | End: 2024-07-22 | Stop reason: HOSPADM

## 2024-07-20 RX ORDER — FENTANYL/BUPIVACAINE/NS/PF 2MCG/ML-.1
PLASTIC BAG, INJECTION (ML) INJECTION
Status: COMPLETED
Start: 2024-07-20 | End: 2024-07-20

## 2024-07-20 RX ORDER — ACETAMINOPHEN 325 MG/1
650 TABLET ORAL EVERY 6 HOURS SCHEDULED
Status: DISCONTINUED | OUTPATIENT
Start: 2024-07-21 | End: 2024-07-22 | Stop reason: HOSPADM

## 2024-07-20 RX ORDER — DIPHENHYDRAMINE HYDROCHLORIDE 50 MG/ML
25 INJECTION INTRAMUSCULAR; INTRAVENOUS EVERY 4 HOURS PRN
Status: DISCONTINUED | OUTPATIENT
Start: 2024-07-20 | End: 2024-07-22 | Stop reason: HOSPADM

## 2024-07-20 RX ORDER — HYDROCODONE BITARTRATE AND ACETAMINOPHEN 10; 325 MG/1; MG/1
1 TABLET ORAL EVERY 4 HOURS PRN
Status: DISCONTINUED | OUTPATIENT
Start: 2024-07-20 | End: 2024-07-22 | Stop reason: HOSPADM

## 2024-07-20 RX ORDER — LIDOCAINE HYDROCHLORIDE AND EPINEPHRINE 15; 5 MG/ML; UG/ML
INJECTION, SOLUTION EPIDURAL
Status: DISCONTINUED | OUTPATIENT
Start: 2024-07-20 | End: 2024-07-20

## 2024-07-20 RX ORDER — FAMOTIDINE 10 MG/ML
20 INJECTION INTRAVENOUS ONCE
OUTPATIENT
Start: 2024-07-20 | End: 2024-07-20

## 2024-07-20 RX ORDER — ACETAMINOPHEN 500 MG
1000 TABLET ORAL EVERY 6 HOURS PRN
Status: COMPLETED | OUTPATIENT
Start: 2024-07-20 | End: 2024-07-20

## 2024-07-20 RX ORDER — PRENATAL WITH FERROUS FUM AND FOLIC ACID 3080; 920; 120; 400; 22; 1.84; 3; 20; 10; 1; 12; 200; 27; 25; 2 [IU]/1; [IU]/1; MG/1; [IU]/1; MG/1; MG/1; MG/1; MG/1; MG/1; MG/1; UG/1; MG/1; MG/1; MG/1; MG/1
1 TABLET ORAL DAILY
Status: DISCONTINUED | OUTPATIENT
Start: 2024-07-21 | End: 2024-07-22 | Stop reason: HOSPADM

## 2024-07-20 RX ORDER — ONDANSETRON 8 MG/1
8 TABLET, ORALLY DISINTEGRATING ORAL EVERY 8 HOURS PRN
Status: DISCONTINUED | OUTPATIENT
Start: 2024-07-20 | End: 2024-07-22 | Stop reason: HOSPADM

## 2024-07-20 RX ORDER — IBUPROFEN 600 MG/1
600 TABLET ORAL EVERY 6 HOURS
Status: DISCONTINUED | OUTPATIENT
Start: 2024-07-21 | End: 2024-07-20

## 2024-07-20 RX ORDER — OXYTOCIN 10 [USP'U]/ML
10 INJECTION, SOLUTION INTRAMUSCULAR; INTRAVENOUS ONCE AS NEEDED
Status: DISCONTINUED | OUTPATIENT
Start: 2024-07-20 | End: 2024-07-22 | Stop reason: HOSPADM

## 2024-07-20 RX ORDER — METHYLERGONOVINE MALEATE 0.2 MG/ML
200 INJECTION INTRAVENOUS ONCE AS NEEDED
Status: DISCONTINUED | OUTPATIENT
Start: 2024-07-20 | End: 2024-07-22 | Stop reason: HOSPADM

## 2024-07-20 RX ORDER — ONDANSETRON HYDROCHLORIDE 2 MG/ML
8 INJECTION, SOLUTION INTRAVENOUS ONCE
Status: COMPLETED | OUTPATIENT
Start: 2024-07-20 | End: 2024-07-20

## 2024-07-20 RX ORDER — FENTANYL/BUPIVACAINE/NS/PF 2MCG/ML-.1
PLASTIC BAG, INJECTION (ML) INJECTION
Status: DISCONTINUED | OUTPATIENT
Start: 2024-07-20 | End: 2024-07-20

## 2024-07-20 RX ORDER — OXYTOCIN-SODIUM CHLORIDE 0.9% IV SOLN 30 UNIT/500ML 30-0.9/5 UT/ML-%
10 SOLUTION INTRAVENOUS ONCE AS NEEDED
Status: DISCONTINUED | OUTPATIENT
Start: 2024-07-20 | End: 2024-07-22 | Stop reason: HOSPADM

## 2024-07-20 RX ORDER — MISOPROSTOL 200 UG/1
800 TABLET ORAL ONCE AS NEEDED
Status: DISCONTINUED | OUTPATIENT
Start: 2024-07-20 | End: 2024-07-22 | Stop reason: HOSPADM

## 2024-07-20 RX ORDER — BUPIVACAINE HYDROCHLORIDE 2.5 MG/ML
INJECTION, SOLUTION INFILTRATION; PERINEURAL
Status: DISCONTINUED | OUTPATIENT
Start: 2024-07-20 | End: 2024-07-20

## 2024-07-20 RX ORDER — OXYTOCIN-SODIUM CHLORIDE 0.9% IV SOLN 30 UNIT/500ML 30-0.9/5 UT/ML-%
95 SOLUTION INTRAVENOUS ONCE AS NEEDED
Status: DISCONTINUED | OUTPATIENT
Start: 2024-07-20 | End: 2024-07-22 | Stop reason: HOSPADM

## 2024-07-20 RX ORDER — DOCUSATE SODIUM 100 MG/1
200 CAPSULE, LIQUID FILLED ORAL 2 TIMES DAILY PRN
Status: DISCONTINUED | OUTPATIENT
Start: 2024-07-20 | End: 2024-07-22 | Stop reason: HOSPADM

## 2024-07-20 RX ORDER — HYDROCODONE BITARTRATE AND ACETAMINOPHEN 5; 325 MG/1; MG/1
1 TABLET ORAL EVERY 4 HOURS PRN
Status: DISCONTINUED | OUTPATIENT
Start: 2024-07-20 | End: 2024-07-22 | Stop reason: HOSPADM

## 2024-07-20 RX ORDER — SODIUM CITRATE AND CITRIC ACID MONOHYDRATE 334; 500 MG/5ML; MG/5ML
30 SOLUTION ORAL ONCE
OUTPATIENT
Start: 2024-07-20 | End: 2024-07-20

## 2024-07-20 RX ORDER — HYDROCORTISONE 25 MG/G
CREAM TOPICAL 3 TIMES DAILY PRN
Status: DISCONTINUED | OUTPATIENT
Start: 2024-07-20 | End: 2024-07-22 | Stop reason: HOSPADM

## 2024-07-20 RX ORDER — IBUPROFEN 600 MG/1
600 TABLET ORAL EVERY 6 HOURS
Status: DISCONTINUED | OUTPATIENT
Start: 2024-07-20 | End: 2024-07-22 | Stop reason: HOSPADM

## 2024-07-20 RX ORDER — SODIUM CHLORIDE 0.9 % (FLUSH) 0.9 %
10 SYRINGE (ML) INJECTION
Status: DISCONTINUED | OUTPATIENT
Start: 2024-07-20 | End: 2024-07-22 | Stop reason: HOSPADM

## 2024-07-20 RX ORDER — OXYTOCIN-SODIUM CHLORIDE 0.9% IV SOLN 30 UNIT/500ML 30-0.9/5 UT/ML-%
95 SOLUTION INTRAVENOUS ONCE
Status: DISCONTINUED | OUTPATIENT
Start: 2024-07-20 | End: 2024-07-22 | Stop reason: HOSPADM

## 2024-07-20 RX ORDER — DIPHENOXYLATE HYDROCHLORIDE AND ATROPINE SULFATE 2.5; .025 MG/1; MG/1
2 TABLET ORAL EVERY 6 HOURS PRN
Status: DISCONTINUED | OUTPATIENT
Start: 2024-07-20 | End: 2024-07-22 | Stop reason: HOSPADM

## 2024-07-20 RX ADMIN — Medication 5 ML: at 02:07

## 2024-07-20 RX ADMIN — OXYTOCIN-SODIUM CHLORIDE 0.9% IV SOLN 30 UNIT/500ML 10 UNITS: 30-0.9/5 SOLUTION at 06:07

## 2024-07-20 RX ADMIN — MISOPROSTOL 800 MCG: 200 TABLET ORAL at 06:07

## 2024-07-20 RX ADMIN — SODIUM CHLORIDE, POTASSIUM CHLORIDE, SODIUM LACTATE AND CALCIUM CHLORIDE 1000 ML: 600; 310; 30; 20 INJECTION, SOLUTION INTRAVENOUS at 01:07

## 2024-07-20 RX ADMIN — DEXTROSE MONOHYDRATE 1 G: 2.5 INJECTION INTRAVENOUS at 06:07

## 2024-07-20 RX ADMIN — LIDOCAINE HYDROCHLORIDE,EPINEPHRINE BITARTRATE 3 ML: 15; .005 INJECTION, SOLUTION EPIDURAL; INFILTRATION; INTRACAUDAL; PERINEURAL at 02:07

## 2024-07-20 RX ADMIN — PANTOPRAZOLE SODIUM 40 MG: 40 TABLET, DELAYED RELEASE ORAL at 10:07

## 2024-07-20 RX ADMIN — ONDANSETRON 8 MG: 2 INJECTION INTRAMUSCULAR; INTRAVENOUS at 01:07

## 2024-07-20 RX ADMIN — ACETAMINOPHEN 650 MG: 325 TABLET, FILM COATED ORAL at 11:07

## 2024-07-20 RX ADMIN — ACETAMINOPHEN 1000 MG: 500 TABLET ORAL at 10:07

## 2024-07-20 RX ADMIN — BUPIVACAINE HYDROCHLORIDE 8 ML: 2.5 INJECTION, SOLUTION INFILTRATION; PERINEURAL at 02:07

## 2024-07-20 RX ADMIN — SODIUM CHLORIDE, SODIUM LACTATE, POTASSIUM CHLORIDE, CALCIUM CHLORIDE AND DEXTROSE MONOHYDRATE: 5; 600; 310; 30; 20 INJECTION, SOLUTION INTRAVENOUS at 11:07

## 2024-07-20 RX ADMIN — Medication 8 ML/HR: at 02:07

## 2024-07-20 RX ADMIN — IBUPROFEN 600 MG: 600 TABLET, FILM COATED ORAL at 07:07

## 2024-07-20 RX ADMIN — PANTOPRAZOLE SODIUM 40 MG: 40 TABLET, DELAYED RELEASE ORAL at 12:07

## 2024-07-20 NOTE — PROGRESS NOTES
LABOR NOTE    S:  Complaints: No.  Epidural working:  yes  Resident to bedside for routine cervical check.    O: /60   Pulse 69   Temp 97.8 °F (36.6 °C) (Oral)   Resp 16   SpO2 96%   Breastfeeding No     FHT: 120bpm; moderate variability; +accels, -decels; Cat 1 (reassuring)  CTX: q 3min, pit @ 20 mU/min  SVE: 4/90/-1; IUPC placed    TIMELINE:  0630: cl/th/hi  0636: cytotec #1 given  0934: ft/50/-3; vazquez attempted however not successful  1055: cytotec #2 given  1145: ft/50/-3; vazquez placed  1600: 1/50/-3, vazquez in place, pit started  2030: 2/50/-3; vazquez in place, pit @ 8mU/min  0100: 4/60/-2, vazquez out, pit at 12; considering epidural  0600: 4/60/-2, AROM clr, pit @ 12  1045: 4/90/-1; IUPC placed    PLAN:  Pitocin augmentation per protocol  Continue Close Maternal/Fetal Monitoring  Recheck 2-4 hours or PRN    Grazyna Malone MD, MPH  OBGYN PGY-4

## 2024-07-20 NOTE — ANESTHESIA PREPROCEDURE EVALUATION
2024  Arianna Kraft is a 39 y.o., female.    OB History    Para Term  AB Living   1 0 0 0 0 0   SAB IAB Ectopic Multiple Live Births   0 0 0 0 0      # Outcome Date GA Lbr Nikko/2nd Weight Sex Type Anes PTL Lv   1 Current               Obstetric Comments   Abnormal pap with HPV and negative since          Pre-op Assessment    I have reviewed the Patient Summary Reports.     I have reviewed the Nursing Notes.    I have reviewed the Medications.     Review of Systems  Anesthesia Hx:  No problems with previous Anesthesia             Denies Family Hx of Anesthesia complications.     Cardiovascular:  Cardiovascular Normal Exercise tolerance: good                                           Pulmonary:  Pulmonary Normal                       Neurological:  Neurology Normal                                        Past Medical History:   Diagnosis Date    Abnormal Pap smear of cervix      No past surgical history on file.  Patient Active Problem List   Diagnosis    Pregnancy related hip pain in third trimester, antepartum    Decreased strength of lower extremity    Encounter for induction of labor    Multigravida of advanced maternal age in third trimester     Facility-Administered Medications as of 2024   Medication Dose Route Frequency Provider Last Rate Last Admin    0.9%  NaCl infusion   Intra-Catheter PRN Ronit [facility] MD        [COMPLETED] acetaminophen tablet 1,000 mg  1,000 mg Oral Once Vianey Bajwa MD   1,000 mg at 24 2340    calcium carbonate 200 mg calcium (500 mg) chewable tablet 500 mg  500 mg Oral TID PRN Ronit [facility] MD        carboprost injection 250 mcg  250 mcg Intramuscular Q15 Min PRN Ronit [facility] MD        diphenoxylate-atropine 2.5-0.025 mg per tablet 2 tablet  2 tablet Oral Q6H PRN Ronit [facility] MD        [COMPLETED] fentanyl 2mcg/mL with  BUPivacaine 0.1% in sdoium chloride 0.9% Epidural 2 mcg/mL- 0.1 % Soln             [COMPLETED] lactated ringers bolus 1,000 mL  1,000 mL Intravenous Ronit Casarez MD   Stopped at 07/20/24 0238    lactated ringers bolus 500 mL  500 mL Intravenous PRN Ronit [facility] MD        lactated ringers bolus 500 mL  500 mL Intravenous Once Meghann Mcpherson MD        lactated ringers infusion   Intravenous Continuous [facility]  mL/hr at 07/20/24 0604 Rate Verify at 07/20/24 0604    LIDOcaine HCL 10 mg/ml (1%) injection 10 mL  10 mL Intradermal Once Ronit Casarez MD        methylergonovine injection 200 mcg  200 mcg Intramuscular Once PRRonit Evans MD        [COMPLETED] misoprostol split tablet 50 mcg  50 mcg Oral Once Nicole Crowley MD   50 mcg at 07/19/24 0636    [COMPLETED] misoprostol split tablet 50 mcg  50 mcg Oral Once Corrie Wiseman MD   50 mcg at 07/19/24 1055    miSOPROStoL tablet 800 mcg  800 mcg Rectal Once PRRonit Evans MD        miSOPROStoL tablet 800 mcg  800 mcg Oral Once Ronit Casarez MD        ondansetron disintegrating tablet 8 mg  8 mg Oral Q8H PRRonit Evans MD   8 mg at 07/19/24 2215    ondansetron injection 4 mg  4 mg Intravenous Once Ronit Santo MD        oxytocin 30 units in 500 mL normal saline infusion (loading dose)  10.0002 Units Intravenous Once Ron[facility] MD   Held at 07/19/24 0645    oxytocin 30 units in 500 mL normal saline infusion (loading dose)  10.0002 Units Intravenous Once Ronit Casarez MD        oxytocin 30 units/500 mL (60 milliunits/mL) in 0.9% NaCl (TITRATING)  95 bonnie-units/min Intravenous Once Ron[facility] MD   Held at 07/19/24 0615    oxytocin 30 units/500 mL (60 milliunits/mL) in 0.9% NaCl (TITRATING)  95 bonnie-units/min Intravenous Once Ronit Casarez MD        oxytocin 30 units/500 mL (60 milliunits/mL) in 0.9% NaCl (TITRATING)  0-32 bonnie-units/min Intravenous Continuous Meghann Durham,  MD 12 mL/hr at 07/20/24 0604 12 bonnie-units/min at 07/20/24 0604    oxytocin injection 10 Units  10 Units Intramuscular Once PRRonit Evans MD        pantoprazole EC tablet 40 mg  40 mg Oral Daily Lupe Pina MD   40 mg at 07/20/24 0019    simethicone chewable tablet 80 mg  1 tablet Oral QID PRRonit Evans MD        tranexamic acid in NaCl,iso-os IVPB 1,000 mg  1,000 mg Intravenous Q30 Min PRRonit Evans MD         Outpatient Medications as of 7/20/2024   Medication Sig Dispense Refill    aspirin (ECOTRIN) 81 MG EC tablet Take 1 tablet (81 mg total) by mouth once daily.  0    doxylamine succinate (UNISOM, DOXYLAMINE, ORAL) Take by mouth.      omeprazole (PRILOSEC OTC) 20 MG tablet Take 20 mg by mouth once daily.      prenatal vit no.124/iron/folic (PRENATAL VITAMIN ORAL) Take by mouth.      Review of patient's allergies indicates:  No Known Allergies     Physical Exam  General: Well nourished, Alert, Cooperative and Oriented    Airway:  Mallampati: II   Mouth Opening: Normal  TM Distance: Normal  Tongue: Normal  Neck ROM: Normal ROM    Chest/Lungs:  Normal Respiratory Rate    Heart:  Rate: Normal    Wt Readings from Last 3 Encounters:   07/16/24 98.1 kg (216 lb 4.3 oz)   07/08/24 97 kg (213 lb 13.5 oz)   07/03/24 97.2 kg (214 lb 4.6 oz)     Temp Readings from Last 3 Encounters:   07/19/24 36.3 °C (97.3 °F)   06/15/24 36.8 °C (98.3 °F) (Oral)   09/15/20 36.6 °C (97.9 °F)     BP Readings from Last 3 Encounters:   07/20/24 121/60   07/16/24 132/80   07/08/24 (!) 140/80     Pulse Readings from Last 3 Encounters:   07/20/24 95   06/15/24 (!) 59   06/10/24 87     Lab Results   Component Value Date    WBC 12.30 07/19/2024    HGB 12.2 07/19/2024    HCT 36.2 (L) 07/19/2024    MCV 87 07/19/2024     07/19/2024         Chemistry        Component Value Date/Time     07/19/2024 0606    K 3.6 07/19/2024 0606     (H) 07/19/2024 0606    CO2 16 (L) 07/19/2024 0606    BUN 9 07/19/2024  0606    CREATININE 0.7 07/19/2024 0606    GLU 91 07/19/2024 0606        Component Value Date/Time    CALCIUM 9.1 07/19/2024 0606    ALKPHOS 117 07/19/2024 0606    AST 18 07/19/2024 0606    ALT 17 07/19/2024 0606    BILITOT 0.3 07/19/2024 0606        No results found for this or any previous visit.     Anesthesia Plan  Type of Anesthesia, risks & benefits discussed:    Anesthesia Type: Gen ETT, Epidural, Spinal, CSE  Intra-op Monitoring Plan: Standard ASA Monitors  Informed Consent: Informed consent signed with the Patient and all parties understand the risks and agree with anesthesia plan.  All questions answered.   ASA Score: 2  Day of Surgery Review of History & Physical: H&P Update referred to the surgeon/provider.    Ready For Surgery From Anesthesia Perspective.     .

## 2024-07-20 NOTE — ANESTHESIA PROCEDURE NOTES
Epidural    Patient location during procedure: OB   Reason for block: primary anesthetic   Reason for block: labor analgesia requested by patient and obstetrician   Start time: 7/20/2024 2:10 AM  Timeout: 7/20/2024 2:05 AM  End time: 7/20/2024 2:20 AM  Surgery related to: Vaginal Delivery    Staffing  Performing Provider: Nakia Stratton MD  Authorizing Provider: Nakia Stratton MD    Staffing  Performed by: Nakia Stratton MD  Authorized by: Nakia Stratton MD        Preanesthetic Checklist  Completed: patient identified, IV checked, site marked, risks and benefits discussed, surgical consent, monitors and equipment checked, pre-op evaluation, timeout performed, anesthesia consent given, hand hygiene performed and patient being monitored  Preparation  Patient position: sitting  Prep: ChloraPrep  Patient monitoring: Pulse Ox  Reason for block: primary anesthetic   Epidural  Skin Anesthetic: lidocaine 1%  Skin Wheal: 3 mL  Administration type: continuous  Approach: midline  Interspace: L3-4    Injection technique: MARÍA saline  Needle and Epidural Catheter  Needle type: Tuohy   Needle gauge: 17  Needle length: 3.5 inches  Catheter type: springwFixstars  Catheter size: 19 G  Catheter at skin depth: 4.5 cm  Insertion Attempts: 1  Test dose: 3 mL of lidocaine 1.5% with Epi 1-to-200,000  Additional Documentation: incremental injection, negative aspiration for heme and CSF, no paresthesia on injection, no signs/symptoms of IV or SA injection, no significant pain on injection and no significant complaints from patient  Needle localization: anatomical landmarks  Medications:  Volume per aspiration: 5 mL  Time between aspirations: 5 minutes   Assessment  Ease of block: easy  Patient's tolerance of the procedure: comfortable throughout block and no complaints No inadvertent dural puncture with Tuohy.  Dural puncture performed with spinal needle.

## 2024-07-20 NOTE — PLAN OF CARE
Problem: Labor  Goal: Hemostasis  Outcome: Progressing     Problem: Skin Injury Risk Increased  Goal: Skin Health and Integrity  Outcome: Progressing     Problem: Infection  Goal: Absence of Infection Signs and Symptoms  Outcome: Progressing     Problem:  Fall Injury Risk  Goal: Absence of Fall, Infant Drop and Related Injury  Outcome: Progressing     Problem: Adult Inpatient Plan of Care  Goal: Plan of Care Review  Outcome: Progressing

## 2024-07-20 NOTE — PROGRESS NOTES
LABOR NOTE    S:  Complaints: No.  Epidural working:  yes  Resident to bedside for routine cervical check.    O: /60   Pulse 95   Temp 97.3 °F (36.3 °C)   Resp 16   SpO2 97%   Breastfeeding No     FHT: 125bpm; moderate variability; +accels, -decels; Cat 1 (reassuring)  CTX: q1-3min, pit @ 12 mU/min  SVE: 4/70/-2; AROM clr    TIMELINE:  0630: cl/th/hi  0636: cytotec #1 given  0934: ft/50/-3; vazquez attempted however not successful  1055: cytotec #2 given  1145: ft/50/-3; vazquez placed  1600: 1/50/-3, vazquez in place, pit started  2030: 2/50/-3; vazquez in place, pit @ 8mU/min  0100: 4/60/-2, vazquez out, pit at 12; considering epidural  0600: 4/60/-2, AROM clr, pit @ 12    PLAN:  Pitocin augmentation per protocol  Continue Close Maternal/Fetal Monitoring  Recheck 2-4 hours or PRN    Sofi Barnhart MD  OB/GYN PGY-2

## 2024-07-20 NOTE — L&D DELIVERY NOTE
[facility] - Labor & Delivery  Vaginal Delivery   Operative Note    SUMMARY     Normal spontaneous vaginal delivery of live male infant with APGARs 8/9 at 1 and 5 minutes respectively. Infant delivered in BLAKE presentation. Tight nuchal cord noted at delivery and reduced at perineum. Meconium stained fluid. Infant was placed on mothers abdomen for skin to skin and bulb suctioning performed. Delayed cord clamping was performed. Cord was cut and clamped and cord blood was collected.    Manual delivery of placenta after 15 minutes of applying gentle traction to cord with minimal movement of placenta. IV pitocin was given noting uterine atony with bleeding. 800 mcg Cytotec was placed rectally which resulted in good uterine tone without bleeding. No trailing membranes were noted on bimanual examination. Placenta was inspected and noted to be intact.    2nd degree laceration noted and repaired in normal fashion with 2-0 Vicryl.    Patient tolerated delivery well. Sponge, needle, and lap counted correctly x2.     EBL 600cc.    Leti Browning MD  OB/GYN PGY-1      Indications: Encounter for induction of labor  Pregnancy complicated by:   Patient Active Problem List   Diagnosis    Pregnancy related hip pain in third trimester, antepartum    Decreased strength of lower extremity    Multigravida of advanced maternal age in third trimester     (spontaneous vaginal delivery)     Admitting GA: 38w1d    Delivery Information for Dyllan Kraft    Birth information:  YOB: 2022   Time of birth: 6:00 PM   Sex: male   Head Delivery Date/Time: 2024  5:59 PM   Delivery type: Vaginal, Spontaneous   Gestational Age: 38w1d        Delivery Providers    Delivering clinician: Shahida Light MD   Provider Role    Leti Browning MD Resident    Yee, Malgorzata, RN Delivery Nurse    Ronit Fowler RN Registered Nurse    Kamilah Green RN Charge Nurse    Grace Peralta RN Registered Nurse               Measurements    Weight:   Length:          Apgars    Living status: Living  Apgar Component Scores:  1 min.:  5 min.:  10 min.:  15 min.:  20 min.:    Skin color:  0  1       Heart rate:  2  2       Reflex irritability:  2  2       Muscle tone:  2  2       Respiratory effort:  2  2       Total:  8  9       Apgars assigned by: FRIEDA PINA RN         Operative Delivery    Forceps attempted?: No  Vacuum extractor attempted?: No         Shoulder Dystocia    Shoulder dystocia present?: No           Presentation    Presentation: Vertex  Position: Occiput Anterior           Interventions/Resuscitation    Method: Bulb Suctioning, Tactile Stimulation       Cord    Vessels: 3 vessels  Complications: Nuchal  Nuchal Intervention: reduced  Nuchal Cord Description: tight nuchal cord  Number of Loops: 1  Delayed Cord Clamping?: Yes  Cord Clamped Date/Time: 2024  6:01 PM  Cord Blood Disposition: Sent with Baby  Gases Sent?: No       Placenta    Placenta delivery date/time: 2024 1808  Placenta removal: Manual removal  Placenta appearance: Intact  Placenta disposition: Discarded           Labor Events:       labor: No     Labor Onset Date/Time:         Dilation Complete Date/Time:         Start Pushing Date/Time:         Start Pushing Date/Time:       Rupture Date/Time: 24  0600         Rupture type: ARM (Artificial Rupture)         Fluid Amount:       Fluid Color: Clear               steroids: None     Antibiotics given for GBS: No     Induction: misoprostol     Indications for induction:  Hypertension     Augmentation:       Indications for augmentation:       Labor complications: None     Additional complications:          Cervical ripening:                     Delivery:      Episiotomy: None     Indication for Episiotomy:       Perineal Lacerations: 2nd Repaired:  Yes   Periurethral Laceration:   Repaired:     Labial Laceration:   Repaired:     Sulcus Laceration:   Repaired:      Vaginal Laceration:   Repaired:     Cervical Laceration:   Repaired:     Repair suture:       Repair # of packets: 2     Last Value - EBL - Nursing (mL):       Sum - EBL - Nursing (mL): 0     Last Value - EBL - Anesthesia (mL):      Calculated QBL (mL):       Running total QBL (mL):       Vaginal Sweep Performed: No     Surgicount Correct: Yes     Vaginal Packing: No Quantity:       Other providers:       Anesthesia    Method: Epidural          Details (if applicable):  Trial of Labor      Categorization:      Priority:     Indications for :     Incision Type:       Additional  information:  Forceps:    Vacuum:    Breech:    Observed anomalies    Other (Comments):

## 2024-07-20 NOTE — PROGRESS NOTES
LABOR NOTE    S:  Complaints: feeling more ctx.  Epidural working:  not applicable  Resident to bedside for routine cervical check.    O: /75   Pulse (!) 49   Temp 97.3 °F (36.3 °C)   Resp 16   SpO2 95%   Breastfeeding No     FHT: 125bpm; moderate variability; +accels, -decels; Cat 1 (reassuring)  CTX: q1-3min, pit @ 12 mU/min  SVE: 4/70/-2; vazquez out    TIMELINE:  0630: cl/th/hi  0636: cytotec #1 given  0934: ft/50/-3; vazquez attempted however not successful  1055: cytotec #2 given  1145: ft/50/-3; vazquez placed  1600: 1/50/-3, vazquez in place, pit started  2030: 2/50/-3; vazquez in place, pit @ 8mU/min  0100: 4/60/-2, vazquez out, pit at 12; considering epidural    PLAN:  Pitocin augmentation per protocol  Continue Close Maternal/Fetal Monitoring  Recheck 2-4 hours or PRN  Anesthesia to bedside to discuss epidural      Vianey Bajwa MD   OB/GYN PGY-3

## 2024-07-20 NOTE — PROGRESS NOTES
LABOR NOTE    S:  Complaints: No.  Epidural working:  not applicable  Resident to bedside for routine cervical check.    O: /80   Pulse 83   Temp 97.6 °F (36.4 °C) (Temporal)   Resp 16   SpO2 95%   Breastfeeding No     FHT: 140bpm; moderate variability; +accels/-decels; Cat 1 (reassuring)  CTX: q3min, pit @ 8 mU/min  SVE: 2/50/-3; vazquez in place    TIMELINE:  0630: cl/th/hi  0636: cytotec #1 given  0934: ft/50/-3; vazquez attempted however not successful  1055: cytotec #2 given  1145: ft/50/-3; vazquez placed  1600: 1/50/-3, vazquez in place, pit started  2030: 2/50/-3; vazquez in place, pit @ 8mU/min    PLAN:  Pitocin augmentation per protocol  Continue Close Maternal/Fetal Monitoring  Recheck 2-4 hours or PRN    Sofi Barnhart MD  OB/GYN PGY-2

## 2024-07-21 PROBLEM — D62 ANEMIA DUE TO BLOOD LOSS, ACUTE: Status: ACTIVE | Noted: 2024-07-21

## 2024-07-21 PROBLEM — O13.9 GESTATIONAL HYPERTENSION: Status: ACTIVE | Noted: 2024-07-21

## 2024-07-21 LAB
BASOPHILS # BLD AUTO: 0.07 K/UL (ref 0–0.2)
BASOPHILS NFR BLD: 0.4 % (ref 0–1.9)
DIFFERENTIAL METHOD BLD: ABNORMAL
EOSINOPHIL # BLD AUTO: 0.2 K/UL (ref 0–0.5)
EOSINOPHIL NFR BLD: 1.1 % (ref 0–8)
ERYTHROCYTE [DISTWIDTH] IN BLOOD BY AUTOMATED COUNT: 13.1 % (ref 11.5–14.5)
HCT VFR BLD AUTO: 31.8 % (ref 37–48.5)
HGB BLD-MCNC: 10.8 G/DL (ref 12–16)
IMM GRANULOCYTES # BLD AUTO: 0.19 K/UL (ref 0–0.04)
IMM GRANULOCYTES NFR BLD AUTO: 1 % (ref 0–0.5)
LYMPHOCYTES # BLD AUTO: 2 K/UL (ref 1–4.8)
LYMPHOCYTES NFR BLD: 10.2 % (ref 18–48)
MCH RBC QN AUTO: 29.9 PG (ref 27–31)
MCHC RBC AUTO-ENTMCNC: 34 G/DL (ref 32–36)
MCV RBC AUTO: 88 FL (ref 82–98)
MONOCYTES # BLD AUTO: 1.6 K/UL (ref 0.3–1)
MONOCYTES NFR BLD: 7.9 % (ref 4–15)
NEUTROPHILS # BLD AUTO: 15.7 K/UL (ref 1.8–7.7)
NEUTROPHILS NFR BLD: 79.4 % (ref 38–73)
NRBC BLD-RTO: 0 /100 WBC
PLATELET # BLD AUTO: 268 K/UL (ref 150–450)
PMV BLD AUTO: 10.5 FL (ref 9.2–12.9)
RBC # BLD AUTO: 3.61 M/UL (ref 4–5.4)
WBC # BLD AUTO: 19.68 K/UL (ref 3.9–12.7)

## 2024-07-21 PROCEDURE — 36415 COLL VENOUS BLD VENIPUNCTURE: CPT | Performed by: OBSTETRICS & GYNECOLOGY

## 2024-07-21 PROCEDURE — 85025 COMPLETE CBC W/AUTO DIFF WBC: CPT | Performed by: OBSTETRICS & GYNECOLOGY

## 2024-07-21 PROCEDURE — 25000003 PHARM REV CODE 250

## 2024-07-21 PROCEDURE — 25000003 PHARM REV CODE 250: Performed by: OBSTETRICS & GYNECOLOGY

## 2024-07-21 PROCEDURE — 11000001 HC ACUTE MED/SURG PRIVATE ROOM

## 2024-07-21 RX ADMIN — DOCUSATE SODIUM 200 MG: 100 CAPSULE, LIQUID FILLED ORAL at 09:07

## 2024-07-21 RX ADMIN — ACETAMINOPHEN 650 MG: 325 TABLET, FILM COATED ORAL at 05:07

## 2024-07-21 RX ADMIN — IBUPROFEN 600 MG: 600 TABLET, FILM COATED ORAL at 03:07

## 2024-07-21 RX ADMIN — IBUPROFEN 600 MG: 600 TABLET, FILM COATED ORAL at 01:07

## 2024-07-21 RX ADMIN — ACETAMINOPHEN 650 MG: 325 TABLET, FILM COATED ORAL at 11:07

## 2024-07-21 RX ADMIN — DOCUSATE SODIUM 200 MG: 100 CAPSULE, LIQUID FILLED ORAL at 08:07

## 2024-07-21 RX ADMIN — PRENATAL VIT W/ FE FUMARATE-FA TAB 27-0.8 MG 1 TABLET: 27-0.8 TAB at 08:07

## 2024-07-21 RX ADMIN — IBUPROFEN 600 MG: 600 TABLET, FILM COATED ORAL at 08:07

## 2024-07-21 RX ADMIN — IBUPROFEN 600 MG: 600 TABLET, FILM COATED ORAL at 09:07

## 2024-07-21 NOTE — ANESTHESIA POSTPROCEDURE EVALUATION
Anesthesia Post Evaluation    Patient: Arianna Kraft    Procedure(s) Performed: * No procedures listed *    Final Anesthesia Type: epidural      Patient location during evaluation: labor & delivery  Patient participation: Yes- Able to Participate  Level of consciousness: awake and alert  Post-procedure vital signs: reviewed and stable  Pain management: adequate  Airway patency: patent  JAYCEE mitigation strategies: Multimodal analgesia and Use of major conduction anesthesia (spinal/epidural) or peripheral nerve block  PONV status at discharge: No PONV  Anesthetic complications: no      Cardiovascular status: blood pressure returned to baseline  Respiratory status: unassisted and spontaneous ventilation  Follow-up not needed.              Vitals Value Taken Time   /74 07/21/24 0851   Temp 36.7 °C (98 °F) 07/21/24 0851   Pulse 64 07/21/24 0851   Resp 18 07/21/24 0851   SpO2 100 % 07/21/24 0851         No case tracking events are documented in the log.      Pain/Perry Score: Pain Rating Prior to Med Admin: 4 (7/21/2024  8:53 AM)  Pain Rating Post Med Admin: 1 (7/21/2024  6:40 AM)

## 2024-07-21 NOTE — PLAN OF CARE
Pt ambulating and voiding without difficulty. Patient safety maintained, side rails up x2, bed low and locked position. Pain well controlled with scheduled pain medication. Fundus midline, firm, with moderate lochia. VSS. Pt responding to infant cues and bonding appropriately. Repeat CBC pending. Admit papers and s&s of Pre-E and PPH reviewed over with pt. Pt states understanding.

## 2024-07-21 NOTE — PLAN OF CARE
VSS. Patient ambulating and voiding independently and without difficulty. Fundus firm without massage, midline, with moderate rubra noted. Pain controlled without PRN pain medications. Safety maintained, bed low and in a locked position. Significant other at bedside. Breastfeeding every 2-3 hours with max assistance and in response to infant cues. No further concerns at this time, will continue to monitor.     Yes

## 2024-07-21 NOTE — PLAN OF CARE
Problem: Infection  Goal: Absence of Infection Signs and Symptoms  Outcome: Progressing     Problem: Labor  Goal: Hemostasis  Outcome: Met  Goal: Stable Fetal Wellbeing  Outcome: Met  Goal: Effective Progression to Delivery  Outcome: Met  Goal: Absence of Infection Signs and Symptoms  Outcome: Met  Goal: Acceptable Pain Control  Outcome: Met  Goal: Normal Uterine Contraction Pattern  Outcome: Met     Problem: Pain Acute  Goal: Optimal Pain Control and Function  Outcome: Progressing     Problem: Skin Injury Risk Increased  Goal: Skin Health and Integrity  Outcome: Progressing     Problem: Anesthesia/Analgesia, Neuraxial  Goal: Safe, Effective Infusion Delivery  Outcome: Met  Goal: Stable Patient-Fetal Status  Outcome: Met  Goal: Absence of Infection Signs and Symptoms  Outcome: Met  Goal: Nausea and Vomiting Relief  Outcome: Met  Goal: Effective Pain Control  Outcome: Met  Goal: Effective Oxygenation and Ventilation  Outcome: Met  Goal: Baseline Motor Function Return  Outcome: Met  Goal: Effective Urinary Elimination  Outcome: Progressing     Problem: Breastfeeding  Goal: Effective Breastfeeding  Outcome: Progressing     Problem: Postpartum (Vaginal Delivery)  Goal: Successful Parent Role Transition  Outcome: Progressing  Goal: Hemostasis  Outcome: Progressing  Goal: Absence of Infection Signs and Symptoms  Outcome: Progressing  Goal: Optimal Pain Control and Function  Outcome: Progressing  Goal: Effective Urinary Elimination  Outcome: Progressing   Stable. Vss. Achieved . Bleeding controlled and Pain improved s/p ibuprofen x1. Pt able to ambulate with 1 person assist; unable to void. In/out cath [id number]=710 ml uop. Tolerated regular diet.

## 2024-07-21 NOTE — HOSPITAL COURSE
2024 PPD #1  39year old G1 now  s/p  w/ GHTN. Second degree laceration. QBL 600cc. Doing well, ambulating, voiding, and tolerating regular diet.Mild PP anemia, asymptomatic. VSS w/ several elevated, 2 severe range BPs during hospital course. Denies headache, visual, epigastric discomfort.   Lochia: steadily decreasing. VSS, afebrile. Breastfeeding infant; baby has latched only just after birth. Lactation has not yet seen pt. PO pain meds managing postpartum discomforts. Has not had BM since birth. Normal involution. Depression/anxiety: no hx and no s/s.  Support at home: partner and family. Rh +, RI. Desired method of contraception is POPs. UTD on immunizations and has had COVID series. Bonding well w/ baby, who is doing well; will FU w/peds provider. Plan D/C tomorrow.     24 PPD2 - Doing well today, normal lochia, pain controlled with PO meds. Having some difficulties with breastfeeding -- pumping, also giving donor milk. Will see lactation again today and come up with a plan. Denies s/s of pre-E. Plan to discharge home today.

## 2024-07-21 NOTE — LACTATION NOTE
Lactation visited pt to assist with feeding. Baby showing hunger cues. Baby placed in football hold on the right breast. Baby roots and opens wide. Baby eventually gets the trigger to close and suck a few times with a couple of swallows. Baby unlatches after a few sucks then re-latches. Breast compression kept the baby sucking on and off. Baby changed to the left breast. Baby would root but had more challenges staying latched on this side. Baby feel asleep placed skin to skin. Pt encouraged to keep the baby skin to skin as much as possible and to feed the baby 8 or more itmes in 24hrs on cue until content. Pt verbalized education.   07/21/24 1400   Maternal Assessment   Breast Shape Bilateral:;wide;angled   Breast Density Bilateral:;soft   [place] Right:;elastic;Left:;firm   Nipples Bilateral:;everted;short   Maternal Infant Feeding   Maternal Emotional State assist needed   Infant Positioning clutch/football;cross-cradle;laid back (ventral)   Signs of Milk Transfer infant jaw motion present;audible swallow   Latch Assistance yes

## 2024-07-21 NOTE — SUBJECTIVE & OBJECTIVE
Interval History: PPD#1    2024 PPD #1  39year old G1 now  s/p  w/ GHTN. Second degree laceration. QBL 600cc. Doing well, ambulating, voiding, and tolerating regular diet.Mild PP anemia, asymptomatic. VSS w/ several elevated, 2 severe range BPs during hospital course. Denies headache, visual, epigastric discomfort.   Lochia: steadily decreasing. VSS, afebrile. Breastfeeding infant; baby has latched only just after birth. Lactation has not yet seen pt. PO pain meds managing postpartum discomforts. Has not had BM since birth. Normal involution. Depression/anxiety: no hx and no s/s.  Support at home: partner and family. Rh +, RI. Desired method of contraception is POPs. UTD on immunizations and has had COVID series. Bonding well w/ baby, who is doing well; will FU w/peds provider. Plan D/C tomorrow.     Objective:     Vital Signs (Most Recent):  Temp: 98 °F (36.7 °C) (24 0851)  Pulse: 64 (24 0851)  Resp: 18 (24 0851)  BP: 128/74 (24 0851)  SpO2: 100 % (24 0851) Vital Signs (24h Range):  Temp:  [97.6 °F (36.4 °C)-98.9 °F (37.2 °C)] 98 °F (36.7 °C)  Pulse:  [] 64  Resp:  [17-18] 18  SpO2:  [94 %-100 %] 100 %  BP: (118-137)/(59-76) 128/74        There is no height or weight on file to calculate BMI.      Intake/Output Summary (Last 24 hours) at 2024 1210  Last data filed at 2024 0908  Gross per 24 hour   Intake 1351.71 ml   Output 3900 ml   Net -2548.29 ml         Significant Labs:  Lab Results   Component Value Date    GROUPTRH O POS 2024    HEPBSAG Non-reactive 2023    STREPBCULT No Group B Streptococcus isolated 2024     Recent Labs   Lab 24  0628   HGB 10.8*   HCT 31.8*       I have personallly reviewed all pertinent lab results from the last 24 hours.    Physical Exam    Gen: A&O x 4, NAD  CV: normal HR  Lungs: normal resp effort  Breasts: bilaterally soft, non-tender, nipples intact without breakdown  Abdomen: soft, non-tender,  uterus firm at U - 2 fb  Diastasis Recti: 3  FB  Perineum: well approximated, no edema or ecchymosis   Lochia: minimal rubra  Ext: bilaterally no pedal edema without signs of DVT    Review of Systems

## 2024-07-21 NOTE — PROGRESS NOTES
[facility])  Obstetrics  Postpartum Progress Note    Patient Name: Arianna Kraft  MRN: [id number]  Admission Date: 2024  Hospital Length of Stay: 2 days  Attending Physician: Ronit Santo MD  Primary Care Provider: Twila, Primary Doctor    Subjective:     Principal Problem:Encounter for induction of labor    Hospital Course:  2024 PPD #1  39year old G1 now  s/p  w/ GHTN. Second degree laceration. QBL 600cc. Doing well, ambulating, voiding, and tolerating regular diet.Mild PP anemia, asymptomatic. VSS w/ several elevated, 2 severe range BPs during hospital course. Denies headache, visual, epigastric discomfort.   Lochia: steadily decreasing. VSS, afebrile. Breastfeeding infant; baby has latched only just after birth. Lactation has not yet seen pt. PO pain meds managing postpartum discomforts. Has not had BM since birth. Normal involution. Depression/anxiety: no hx and no s/s.  Support at home: partner and family. Rh +, RI. Desired method of contraception is POPs. UTD on immunizations and has had COVID series. Bonding well w/ baby, who is doing well; will FU w/peds provider. Plan D/C tomorrow.     Interval History: PPD#1    2024 PPD #1  39year old G1 now  s/p  w/ GHTN. Second degree laceration. QBL 600cc. Doing well, ambulating, voiding, and tolerating regular diet.Mild PP anemia, asymptomatic. VSS w/ several elevated, 2 severe range BPs during hospital course. Denies headache, visual, epigastric discomfort.   Lochia: steadily decreasing. VSS, afebrile. Breastfeeding infant; baby has latched only just after birth. Lactation has not yet seen pt. PO pain meds managing postpartum discomforts. Has not had BM since birth. Normal involution. Depression/anxiety: no hx and no s/s.  Support at home: partner and family. Rh +, RI. Desired method of contraception is POPs. UTD on immunizations and has had COVID series. Bonding well w/ baby, who is doing well; will FU w/peds  provider. Plan D/C tomorrow.     Objective:     Vital Signs (Most Recent):  Temp: 98 °F (36.7 °C) (24 08)  Pulse: 64 (24)  Resp: 18 (24)  BP: 128/74 (24)  SpO2: 100 % (24) Vital Signs (24h Range):  Temp:  [97.6 °F (36.4 °C)-98.9 °F (37.2 °C)] 98 °F (36.7 °C)  Pulse:  [] 64  Resp:  [17-18] 18  SpO2:  [94 %-100 %] 100 %  BP: (118-137)/(59-76) 128/74        There is no height or weight on file to calculate BMI.      Intake/Output Summary (Last 24 hours) at 2024 1210  Last data filed at 2024 0908  Gross per 24 hour   Intake 1351.71 ml   Output 3900 ml   Net -2548.29 ml         Significant Labs:  Lab Results   Component Value Date    GROUPTRH O POS 2024    HEPBSAG Non-reactive 2023    STREPBCULT No Group B Streptococcus isolated 2024     Recent Labs   Lab 24  0628   HGB 10.8*   HCT 31.8*       I have personallly reviewed all pertinent lab results from the last 24 hours.    Physical Exam    Gen: A&O x 4, NAD  CV: normal HR  Lungs: normal resp effort  Breasts: bilaterally soft, non-tender, nipples intact without breakdown  Abdomen: soft, non-tender, uterus firm at U - 2 fb  Diastasis Recti: 3  FB  Perineum: well approximated, no edema or ecchymosis   Lochia: minimal rubra  Ext: bilaterally no pedal edema without signs of DVT    Review of Systems  Assessment/Plan:     39 y.o. female  for:    2024 PPD #1  39year old G1 now  s/p  w/ GHTN. Second degree laceration. QBL 600cc. Stable. Mild PP anemia, asymptomatic. Breastfeeding. Rh +, RI. Desired method of contraception is POPs. UTD on immunizations and has had COVID series.      Plan D/C tomorrow.       Disposition: As patient meets milestones, will plan to discharge 2024.    Arina Hyde CNM  Obstetrics  [facility])

## 2024-07-22 ENCOUNTER — PATIENT MESSAGE (OUTPATIENT)
Dept: LACTATION | Facility: CLINIC | Age: 39
End: 2024-07-22
Payer: COMMERCIAL

## 2024-07-22 VITALS
SYSTOLIC BLOOD PRESSURE: 127 MMHG | OXYGEN SATURATION: 100 % | RESPIRATION RATE: 18 BRPM | HEART RATE: 69 BPM | DIASTOLIC BLOOD PRESSURE: 68 MMHG | TEMPERATURE: 98 F

## 2024-07-22 PROCEDURE — 25000003 PHARM REV CODE 250: Performed by: OBSTETRICS & GYNECOLOGY

## 2024-07-22 PROCEDURE — 25000003 PHARM REV CODE 250

## 2024-07-22 RX ORDER — IBUPROFEN 600 MG/1
600 TABLET ORAL EVERY 6 HOURS PRN
Qty: 30 TABLET | Refills: 0 | Status: SHIPPED | OUTPATIENT
Start: 2024-07-22

## 2024-07-22 RX ADMIN — IBUPROFEN 600 MG: 600 TABLET, FILM COATED ORAL at 03:07

## 2024-07-22 RX ADMIN — ACETAMINOPHEN 650 MG: 325 TABLET, FILM COATED ORAL at 12:07

## 2024-07-22 RX ADMIN — DOCUSATE SODIUM 200 MG: 100 CAPSULE, LIQUID FILLED ORAL at 07:07

## 2024-07-22 RX ADMIN — PRENATAL VIT W/ FE FUMARATE-FA TAB 27-0.8 MG 1 TABLET: 27-0.8 TAB at 07:07

## 2024-07-22 RX ADMIN — ACETAMINOPHEN 650 MG: 325 TABLET, FILM COATED ORAL at 07:07

## 2024-07-22 RX ADMIN — IBUPROFEN 600 MG: 600 TABLET, FILM COATED ORAL at 10:07

## 2024-07-22 NOTE — DISCHARGE SUMMARY
[facility])  Obstetrics  Discharge Summary      Patient Name: Arianna Kraft  MRN: [id number]  Admission Date: 2024  Hospital Length of Stay: 3 days  Discharge Date and Time:  2024 10:33 AM  Attending Physician: Ronit Santo MD   Discharging Provider: Rosibel Lemus CNM   Primary Care Provider: Twila, Primary Doctor    HPI: No notes on file        * No surgery found *     Hospital Course:   2024 PPD #1  39year old G1 now  s/p  w/ GHTN. Second degree laceration. QBL 600cc. Doing well, ambulating, voiding, and tolerating regular diet.Mild PP anemia, asymptomatic. VSS w/ several elevated, 2 severe range BPs during hospital course. Denies headache, visual, epigastric discomfort.   Lochia: steadily decreasing. VSS, afebrile. Breastfeeding infant; baby has latched only just after birth. Lactation has not yet seen pt. PO pain meds managing postpartum discomforts. Has not had BM since birth. Normal involution. Depression/anxiety: no hx and no s/s.  Support at home: partner and family. Rh +, RI. Desired method of contraception is POPs. UTD on immunizations and has had COVID series. Bonding well w/ baby, who is doing well; will FU w/peds provider. Plan D/C tomorrow.     24 PPD2 - Doing well today, normal lochia, pain controlled with PO meds. Having some difficulties with breastfeeding -- pumping, also giving donor milk. Will see lactation again today and come up with a plan. Denies s/s of pre-E. Plan to discharge home today.          Final Active Diagnoses:    Diagnosis Date Noted POA    PRINCIPAL PROBLEM:   (spontaneous vaginal delivery) [O80] 2024 Not Applicable    Breast feeding status of mother [Z39.1] 2024 Not Applicable    Second degree perineal laceration during delivery, delivered [O70.1] 2024 No    Anemia due to blood loss, acute [D62] 2024 No    Gestational hypertension [O13.9] 2024 Yes    Multigravida of advanced maternal age  "in third trimester [O09.523] 2024 Yes      Problems Resolved During this Admission:    Diagnosis Date Noted Date Resolved POA    Encounter for induction of labor [Z34.90] 2024 Not Applicable        Significant Diagnostic Studies: Labs: CBC   Recent Labs   Lab 24  0628   WBC 19.68*   HGB 10.8*   HCT 31.8*            Feeding Method: breast    Immunizations       Date Immunization Status Dose Route/Site Given by    24 MMR Incomplete 0.5 mL Subcutaneous/     24 Tdap Incomplete 0.5 mL Intramuscular/             Delivery:    Episiotomy: None   Lacerations: 2nd   Repair suture:     Repair # of packets: 2   Blood loss (ml):       Birth information:  YOB: 2022   Time of birth: 6:00 PM   Sex: male   Delivery type: Vaginal, Spontaneous   Gestational Age: 38w1d     Measurements    Weight: 3420 g  Weight (lbs): 7 lb 8.6 oz  Length: 53.3 cm  Length (in): 21"  Head circumference: 36.5 cm  Chest circumference: 32.4 cm         Delivery Clinician: Delivery Providers    Delivering clinician: Shahida Light MD   Provider Role    Leti Browning MD Resident    Yee, Malgorzata, RN Delivery Nurse    Ronit Fowler RN Registered Nurse    Kamilah Green RN Charge Nurse    Grace Peralta RN Registered Nurse             Additional  information:  Forceps:    Vacuum:    Breech:    Observed anomalies      Living?:     Apgars    Living status: Living  Apgar Component Scores:  1 min.:  5 min.:  10 min.:  15 min.:  20 min.:    Skin color:  0  1       Heart rate:  2  2       Reflex irritability:  2  2       Muscle tone:  2  2       Respiratory effort:  2  2       Total:  8  9       Apgars assigned by: FRIEDA PERALTA RN         Placenta: Delivered:       appearance  Pending Diagnostic Studies:       None            Discharged Condition: stable    Disposition: Home or Self Care    Follow Up:   Follow-up Information       Ronit Santo MD. " Schedule an appointment as soon as possible for a visit in 6 week(s).    Specialty: Obstetrics and Gynecology  Why: Postpartum visit  Contact information:  [address]  [address]  [place] [address]  [phone]               Ronit Santo MD. Schedule an appointment as soon as possible for a visit in 1 week(s).    Specialty: Obstetrics and Gynecology  Why: Blood pressure check  Contact information:  [address]  [address]  [place] [address]  [phone]               Ronit Santo MD Follow up in 3 day(s).    Specialty: Obstetrics and Gynecology  Why: BP check at home within 72 hours of discharge -- can do with home blood pressure cuff  Contact information:  [address]  [address]  [place] [address]  [phone]                           Patient Instructions:      Diet Adult Regular     Pelvic Rest     Notify your health care provider if you experience any of the following:  temperature >100.4     Notify your health care provider if you experience any of the following:  persistent nausea and vomiting or diarrhea     Notify your health care provider if you experience any of the following:  severe uncontrolled pain     Notify your health care provider if you experience any of the following:  redness, tenderness, or signs of infection (pain, swelling, redness, odor or green/yellow discharge around incision site)     Notify your health care provider if you experience any of the following:  difficulty breathing or increased cough     Notify your health care provider if you experience any of the following:  severe persistent headache     Notify your health care provider if you experience any of the following:  worsening rash     Notify your health care provider if you experience any of the following:  persistent dizziness, light-headedness, or visual disturbances     Notify your health care provider if you experience any of the following:  increased confusion or weakness     Notify  your health care provider if you experience any of the following:     Activity as tolerated     Medications:  Current Discharge Medication List        START taking these medications    Details   ibuprofen (ADVIL,MOTRIN) 600 MG tablet Take 1 tablet (600 mg total) by mouth every 6 (six) hours as needed for Pain.  Qty: 30 tablet, Refills: 0           CONTINUE these medications which have NOT CHANGED    Details   doxylamine succinate (UNISOM, DOXYLAMINE, ORAL) Take by mouth.      omeprazole (PRILOSEC OTC) 20 MG tablet Take 20 mg by mouth once daily.      prenatal vit no.124/iron/folic (PRENATAL VITAMIN ORAL) Take by mouth.             Rosibel Lemus CNM  Obstetrics  [facility] ([place])

## 2024-07-22 NOTE — LACTATION NOTE
Alticasthony pump, tubing, collections containers and labels brought to bedside.  Discussed proper pump setting of initiation phase.  Instructed on proper usage of pump and to adjust suction according to maximum comfort level.  Verified appropriate flange fit.  Educated on the frequency and duration of pumping in order to promote and maintain a full milk supply. Instructed on cleaning of breast pump parts.  Written instructions also given.  Pt verbalized understanding and appropriate recall for proper milk handling, collection, labeling, storage and transportation.

## 2024-07-22 NOTE — ASSESSMENT & PLAN NOTE
7/22/24 PPD2 - Doing well today, normal lochia, pain controlled with PO meds. Having some difficulties with breastfeeding -- pumping, also giving donor milk. Will see lactation again today and come up with a plan. Denies s/s of pre-E. Plan to discharge home today.

## 2024-07-22 NOTE — PLAN OF CARE
Discharge teaching provided, patient verbalized understanding. VSS. Patient ambulating and voiding independently and without difficulty. Fundus firm without massage, midline, with light rubra noted. Pain controlled without PRN pain medications. Safety maintained, bed low and in a locked position. Significant other at bedside. Breastfeeding every 2-3 hours and supplementing with donor breast milk. No further concerns at this time, will continue to monitor.

## 2024-07-22 NOTE — LACTATION NOTE
07/22/24 0930   Breasts WDL   Breast WDL WDL   Breast Pumping   Breast Pumping double electric breast pump utilized   Breast Pumping Interventions post-feed pumping encouraged   Maternal Feeding Assessment   Maternal Emotional State assist needed   Infant Positioning cross-cradle   Signs of Milk Transfer infant jaw motion present  (AUDIBLE SWALLOWS WITH SNS)   Latch Assistance yes   Pain with Feeding no   Comfort Measures Before/During Feeding infant position adjusted;latch adjusted;maternal position adjusted   Comfort Measures Following Feeding air-drying encouraged   Reproductive Interventions   Breast Care: Breastfeeding open to air   Breastfeeding Assistance assisted with positioning;feeding cue recognition promoted;feeding on demand promoted;electric breast pump used;feeding session observed;infant latch-on verified;infant suck/swallow verified;support offered;supplemental feeding provided;other (see comments)  (sns)   Breastfeeding Support maternal rest encouraged;maternal nutrition promoted;maternal hydration promoted;lactation counseling provided;infant-mother separation minimized;encouragement provided;diary/feeding log utilized     Lactation note: LC reviewed discharge education. Pt to continue to nurse, pump and supplement. LC assisted pt with positioning herself and infant for optimal latch. Infant placed cross cradle hold, opens wide , does tend to hold the breast in his mouth, needs breast compression and stimulation to continue the feeding.   SNS initiated, use and care reviewed. Infant successfully latched with SNS at the breast, nursing well with audible swallows, suck rhythmic at the breast.   Pt will utilize SNS at the breast for feedings , will continue to pump after each feeding. If SNS not used pt will nurse, pump and supplement with bottle using slow flow nipple.   Out Patient Consult booked for pre and post feed assessment .

## 2024-07-22 NOTE — SUBJECTIVE & OBJECTIVE
Interval History: PPD2    She is doing well this morning. She is tolerating a regular diet without nausea or vomiting. She is voiding spontaneously. She is ambulating. She has passed flatus, and has a BM. Vaginal bleeding is mild. She denies fever or chills. Abdominal pain is mild and controlled with oral medications. She Is breastfeeding. She desires circumcision for her male baby: not applicable -- circ deferred to urology.    Objective:     Vital Signs (Most Recent):  Temp: 97.7 °F (36.5 °C) (07/22/24 0935)  Pulse: 68 (07/22/24 0935)  Resp: 18 (07/22/24 0935)  BP: (!) 146/82 (07/22/24 0935)  SpO2: 100 % (07/22/24 0935) Vital Signs (24h Range):  Temp:  [97.5 °F (36.4 °C)-98 °F (36.7 °C)] 97.7 °F (36.5 °C)  Pulse:  [65-72] 68  Resp:  [18] 18  SpO2:  [98 %-100 %] 100 %  BP: (134-146)/(70-82) 146/82        There is no height or weight on file to calculate BMI.    No intake or output data in the 24 hours ending 07/22/24 1025      Significant Labs:  Lab Results   Component Value Date    GROUPTRH O POS 07/19/2024    HEPBSAG Non-reactive 12/01/2023    STREPBCULT No Group B Streptococcus isolated 07/03/2024     Recent Labs   Lab 07/21/24  0628   HGB 10.8*   HCT 31.8*       I have personallly reviewed all pertinent lab results from the last 24 hours.    Physical Exam:   Constitutional: She is oriented to person, place, and time. She appears well-developed and well-nourished.    HENT:   Head: Normocephalic and atraumatic.    Eyes: Conjunctivae are normal.     Cardiovascular:  Normal rate.             Pulmonary/Chest: Effort normal.        Abdominal: Soft. There is no abdominal tenderness.     Genitourinary: There is vaginal discharge (lochia) in the vagina.           Musculoskeletal: Normal range of motion.       Neurological: She is alert and oriented to person, place, and time.    Skin: Skin is warm and dry.    Psychiatric: She has a normal mood and affect. Her behavior is normal. Judgment and thought content normal.        Review of Systems   Constitutional:  Negative for chills and fever.   Eyes: Negative.    Respiratory: Negative.     Cardiovascular: Negative.    Gastrointestinal:  Positive for abdominal pain (cramping). Negative for nausea and vomiting.   Endocrine: Negative.    Genitourinary:  Positive for vaginal bleeding (lochia). Negative for vaginal odor.   Musculoskeletal: Negative.    Integumentary:  Negative.   Neurological: Negative.    Hematological: Negative.    Psychiatric/Behavioral: Negative.     Breast: negative.

## 2024-07-22 NOTE — PROGRESS NOTES
[facility])  Obstetrics  Postpartum Progress Note    Patient Name: Arianna Kraft  MRN: [id number]  Admission Date: 2024  Hospital Length of Stay: 3 days  Attending Physician: Ronit Santo MD  Primary Care Provider: Twila, Primary Doctor    Subjective:     Principal Problem:Encounter for induction of labor    Hospital Course:  2024 PPD #1  39year old G1 now  s/p  w/ GHTN. Second degree laceration. QBL 600cc. Doing well, ambulating, voiding, and tolerating regular diet.Mild PP anemia, asymptomatic. VSS w/ several elevated, 2 severe range BPs during hospital course. Denies headache, visual, epigastric discomfort.   Lochia: steadily decreasing. VSS, afebrile. Breastfeeding infant; baby has latched only just after birth. Lactation has not yet seen pt. PO pain meds managing postpartum discomforts. Has not had BM since birth. Normal involution. Depression/anxiety: no hx and no s/s.  Support at home: partner and family. Rh +, RI. Desired method of contraception is POPs. UTD on immunizations and has had COVID series. Bonding well w/ baby, who is doing well; will FU w/peds provider. Plan D/C tomorrow.     24 PPD2 - Doing well today, normal lochia, pain controlled with PO meds. Having some difficulties with breastfeeding -- pumping, also giving donor milk. Will see lactation again today and come up with a plan. Denies s/s of pre-E. Plan to discharge home today.     Interval History: PPD2    She is doing well this morning. She is tolerating a regular diet without nausea or vomiting. She is voiding spontaneously. She is ambulating. She has passed flatus, and has a BM. Vaginal bleeding is mild. She denies fever or chills. Abdominal pain is mild and controlled with oral medications. She Is breastfeeding. She desires circumcision for her male baby: not applicable -- circ deferred to urology.    Objective:     Vital Signs (Most Recent):  Temp: 97.7 °F (36.5 °C) (24 0935)  Pulse:  68 (07/22/24 0935)  Resp: 18 (07/22/24 0935)  BP: (!) 146/82 (07/22/24 0935)  SpO2: 100 % (07/22/24 0935) Vital Signs (24h Range):  Temp:  [97.5 °F (36.4 °C)-98 °F (36.7 °C)] 97.7 °F (36.5 °C)  Pulse:  [65-72] 68  Resp:  [18] 18  SpO2:  [98 %-100 %] 100 %  BP: (134-146)/(70-82) 146/82        There is no height or weight on file to calculate BMI.    No intake or output data in the 24 hours ending 07/22/24 1025      Significant Labs:  Lab Results   Component Value Date    GROUPTRH O POS 07/19/2024    HEPBSAG Non-reactive 12/01/2023    STREPBCULT No Group B Streptococcus isolated 07/03/2024     Recent Labs   Lab 07/21/24  0628   HGB 10.8*   HCT 31.8*       I have personallly reviewed all pertinent lab results from the last 24 hours.    Physical Exam:   Constitutional: She is oriented to person, place, and time. She appears well-developed and well-nourished.    HENT:   Head: Normocephalic and atraumatic.    Eyes: Conjunctivae are normal.     Cardiovascular:  Normal rate.             Pulmonary/Chest: Effort normal.        Abdominal: Soft. There is no abdominal tenderness.     Genitourinary: There is vaginal discharge (lochia) in the vagina.           Musculoskeletal: Normal range of motion.       Neurological: She is alert and oriented to person, place, and time.    Skin: Skin is warm and dry.    Psychiatric: She has a normal mood and affect. Her behavior is normal. Judgment and thought content normal.       Review of Systems   Constitutional:  Negative for chills and fever.   Eyes: Negative.    Respiratory: Negative.     Cardiovascular: Negative.    Gastrointestinal:  Positive for abdominal pain (cramping). Negative for nausea and vomiting.   Endocrine: Negative.    Genitourinary:  Positive for vaginal bleeding (lochia). Negative for vaginal odor.   Musculoskeletal: Negative.    Integumentary:  Negative.   Neurological: Negative.    Hematological: Negative.    Psychiatric/Behavioral: Negative.     Breast: negative.       Assessment/Plan:     39 y.o. female  for:    Gestational hypertension  1 week BP check    Anemia due to blood loss, acute  Asymptomatic    Second degree perineal laceration during delivery, delivered  Routine pericare     (spontaneous vaginal delivery)    24 PPD2 - Doing well today, normal lochia, pain controlled with PO meds. Having some difficulties with breastfeeding -- pumping, also giving donor milk. Will see lactation again today and come up with a plan. Denies s/s of pre-E. Plan to discharge home today.         Disposition: As patient meets milestones, will plan to discharge today.    Rosibel Lemus CNM  Obstetrics  [facility]

## 2024-07-22 NOTE — DISCHARGE INSTRUCTIONS
Community Resources for Breastfeeding Mothers:   Hospital Breastfeeding Centers/ Lactation Consultants:   [facility]........................................................................................[phone]   [facility]....................................................................................[phone]   [facility]..........................[phone]   [facility].................................................[phone]   [facility].......................................................[phone]   [facility].................................[phone]   [facility].......................................[phone]   [facility]..................................................[phone]   [facility].....................................................................[phone]      [facility] (Poison Control)...........................................................[phone]    PoisonHelp.org   Free medical advice 24/7 through the Poison Help Line and the online tool      Online Resources:   International Breastfeeding [place] ...............................................................................ibconline.ca   Dr Jai Wood online resource provides videos, articles, and information sheets.     Coeffective...............................................................................................................coeffective.com   Download the free mobile dany to help get off to a great start with breastfeeding.   Droplet.....................................................................................................................Localsensor.com   Global Health  Media...........................................................................................Playhem.org   Videos that teach and empower mothers and caregivers   Infant [facility].............................................................................[phone]      SmartShoot   Provides up to date information for medication use by moms during pregnancy and while breastfeeding.   Jacquelyn Michaels....................................................................................................................kellymom.com   Provides online information on breastfeeding and parenting      La Leche League........................................................................................ lllalmsla.org   /   llli.org   Mother-to-mother support groups with education, information support, and encouragement    Work and Pump........................................................................................... Guaranteach.com   Information about breastfeeding for working moms     [place] Resources:   [place] Breastfeeding [facility]............................... [phone]    [facility]   Find local breastfeeding support   [place] Breastfeeding Support............................................................ LaBreastfeedingSport.org   Zip code search of breastfeeding resources in your area   Partners for Healthy Babies............................................................[phone]   4418558nihk.org   Connects [place] moms and their families to health and pregnancy resources.  24/7   WIC (Women, Infant, Children)......................................................... [phone]   ldh.la.gov/WIC   Download the Yeelion dany, get code from WIC office    [facility] Resources:     Baby Cafe............................................................................................................. babycafeusa.org   Free, drop in, informal  breastfeeding support groups offering professional lactation care and intervention.    [facility]/ [place] [facility]....................................... birthmarkLearncafe.com   Infant feeding drop in clinics, Lactation services, support groups, education programs   [facility]...............................................[phone]   EndoEvolution.com/groups/[facility]   Free breastfeeding support group for families of color   Mothers Milk Bank [facility]....................................................[phone]                                                             WebXiomsIDSS Holdings.KAHR medical/services/mothers-milk-bank-[facility]   KEZIA Nesting..................................................................................[phone] nolanesting.com   In person and virtual support for families through pregnancy, birth, and early parenthood.       [facility] [place]- Dr. Kell Mccartney.......................[phone]   [address] [place] [address]                                  [email or web address]   [email or web address]   YABUY Lactation Care, [facility] (Radha Muse RN, IBCLC) ............................[phone] [email or web address]    Healthy Start [place].....................................[phone] ([place])  [phone] ([place])   [facility].gov/health-department/healthy-start   Serves women of childbearing age and addresses issues for pregnant women and their children from birth  to age two.          La Leche League- [place]............................. [employer]/ EndoEvolution.[employer]/ Yordermasoud   In person and virtual mother to mother support groups with education, information support and   encouragement to women who want to breastfeed      [place] Resources:   Breastfeeding Resources- [facility] [place] [facility]  Health.....msdh.ms.gov (under womens services)   Find resources and info about planning for breastfeeding, its benefits, and help with breastfeeding  s uccessfully.    Center For Pregnancy Choices- [place]....................................... [place]nee   [phone]   [address] [place], [place]. Call or text 24/7   [facility].......................................................Bionovoastbreastfeedingcenter.Devex   [facility] Lactation Consultants [facility], including [facility],   [facility], and surrounding areas.    [place] Breastfeeding Coalition...............................................................................msbfc.org   Promotes and supports breastfeeding with families, health providers, and communities.   [facility] breastfeeding Coalition.....................................................................smbfc.org   Find breastfeeding resources and support groups in your area.    WIC Nutrition Program- [facility] of Health.................................... ms.gov

## 2024-07-22 NOTE — PLAN OF CARE
Pt ambulating and voiding without difficulty. Patient safety maintained, side rails up, bed low and locked position.  Pain well controlled with scheduled pain medication. Fundus midline, firm, with light lochia. VSS. Significant other at bedside; parents responding to infant cues and bonding appropriately.

## 2024-07-23 ENCOUNTER — PATIENT MESSAGE (OUTPATIENT)
Dept: OBSTETRICS AND GYNECOLOGY | Facility: OTHER | Age: 39
End: 2024-07-23
Payer: COMMERCIAL

## 2024-07-26 ENCOUNTER — POSTPARTUM VISIT (OUTPATIENT)
Dept: OBSTETRICS AND GYNECOLOGY | Facility: CLINIC | Age: 39
End: 2024-07-26
Payer: COMMERCIAL

## 2024-07-26 ENCOUNTER — LAB VISIT (OUTPATIENT)
Dept: LAB | Facility: OTHER | Age: 39
End: 2024-07-26
Attending: OBSTETRICS & GYNECOLOGY
Payer: COMMERCIAL

## 2024-07-26 VITALS — BODY MASS INDEX: 32.11 KG/M2 | DIASTOLIC BLOOD PRESSURE: 72 MMHG | WEIGHT: 205 LBS | SYSTOLIC BLOOD PRESSURE: 138 MMHG

## 2024-07-26 LAB
ALBUMIN SERPL BCP-MCNC: 2.9 G/DL (ref 3.5–5.2)
ALP SERPL-CCNC: 94 U/L (ref 55–135)
ALT SERPL W/O P-5'-P-CCNC: 67 U/L (ref 10–44)
ANION GAP SERPL CALC-SCNC: 9 MMOL/L (ref 8–16)
AST SERPL-CCNC: 34 U/L (ref 10–40)
BASOPHILS # BLD AUTO: 0.09 K/UL (ref 0–0.2)
BASOPHILS NFR BLD: 0.9 % (ref 0–1.9)
BILIRUB SERPL-MCNC: 0.2 MG/DL (ref 0.1–1)
BUN SERPL-MCNC: 13 MG/DL (ref 6–20)
CALCIUM SERPL-MCNC: 9.3 MG/DL (ref 8.7–10.5)
CHLORIDE SERPL-SCNC: 108 MMOL/L (ref 95–110)
CO2 SERPL-SCNC: 21 MMOL/L (ref 23–29)
CREAT SERPL-MCNC: 0.8 MG/DL (ref 0.5–1.4)
DIFFERENTIAL METHOD BLD: ABNORMAL
EOSINOPHIL # BLD AUTO: 0.3 K/UL (ref 0–0.5)
EOSINOPHIL NFR BLD: 2.6 % (ref 0–8)
ERYTHROCYTE [DISTWIDTH] IN BLOOD BY AUTOMATED COUNT: 13 % (ref 11.5–14.5)
EST. GFR  (NO RACE VARIABLE): >60 ML/MIN/1.73 M^2
GLUCOSE SERPL-MCNC: 77 MG/DL (ref 70–110)
HCT VFR BLD AUTO: 33.4 % (ref 37–48.5)
HGB BLD-MCNC: 11.1 G/DL (ref 12–16)
IMM GRANULOCYTES # BLD AUTO: 0.28 K/UL (ref 0–0.04)
IMM GRANULOCYTES NFR BLD AUTO: 3 % (ref 0–0.5)
LYMPHOCYTES # BLD AUTO: 1.7 K/UL (ref 1–4.8)
LYMPHOCYTES NFR BLD: 17.6 % (ref 18–48)
MCH RBC QN AUTO: 29.3 PG (ref 27–31)
MCHC RBC AUTO-ENTMCNC: 33.2 G/DL (ref 32–36)
MCV RBC AUTO: 88 FL (ref 82–98)
MONOCYTES # BLD AUTO: 0.7 K/UL (ref 0.3–1)
MONOCYTES NFR BLD: 7.6 % (ref 4–15)
NEUTROPHILS # BLD AUTO: 6.5 K/UL (ref 1.8–7.7)
NEUTROPHILS NFR BLD: 68.3 % (ref 38–73)
NRBC BLD-RTO: 0 /100 WBC
PLATELET # BLD AUTO: 319 K/UL (ref 150–450)
PLATELET BLD QL SMEAR: ABNORMAL
PMV BLD AUTO: 10 FL (ref 9.2–12.9)
POTASSIUM SERPL-SCNC: 4.8 MMOL/L (ref 3.5–5.1)
PROT SERPL-MCNC: 6.5 G/DL (ref 6–8.4)
RBC # BLD AUTO: 3.79 M/UL (ref 4–5.4)
SODIUM SERPL-SCNC: 138 MMOL/L (ref 136–145)
WBC # BLD AUTO: 9.48 K/UL (ref 3.9–12.7)

## 2024-07-26 PROCEDURE — 99999 PR PBB SHADOW E&M-EST. PATIENT-LVL III: CPT | Mod: PBBFAC,,, | Performed by: OBSTETRICS & GYNECOLOGY

## 2024-07-26 PROCEDURE — 85025 COMPLETE CBC W/AUTO DIFF WBC: CPT | Performed by: OBSTETRICS & GYNECOLOGY

## 2024-07-26 PROCEDURE — 36415 COLL VENOUS BLD VENIPUNCTURE: CPT | Performed by: OBSTETRICS & GYNECOLOGY

## 2024-07-26 PROCEDURE — 80053 COMPREHEN METABOLIC PANEL: CPT | Performed by: OBSTETRICS & GYNECOLOGY

## 2024-07-26 PROCEDURE — 0503F POSTPARTUM CARE VISIT: CPT | Mod: CPTII,S$GLB,, | Performed by: OBSTETRICS & GYNECOLOGY

## 2024-07-29 ENCOUNTER — HOSPITAL ENCOUNTER (EMERGENCY)
Facility: OTHER | Age: 39
Discharge: HOME | End: 2024-07-29
Attending: OBSTETRICS & GYNECOLOGY
Payer: COMMERCIAL

## 2024-07-29 ENCOUNTER — TELEPHONE (OUTPATIENT)
Dept: OBSTETRICS AND GYNECOLOGY | Facility: CLINIC | Age: 39
End: 2024-07-29
Payer: COMMERCIAL

## 2024-07-29 ENCOUNTER — TELEPHONE (OUTPATIENT)
Facility: CLINIC | Age: 39
End: 2024-07-29
Payer: COMMERCIAL

## 2024-07-29 VITALS
HEART RATE: 72 BPM | TEMPERATURE: 98 F | RESPIRATION RATE: 20 BRPM | SYSTOLIC BLOOD PRESSURE: 145 MMHG | DIASTOLIC BLOOD PRESSURE: 84 MMHG

## 2024-07-29 DIAGNOSIS — O13.3 GESTATIONAL HYPERTENSION, THIRD TRIMESTER: ICD-10-CM

## 2024-07-29 DIAGNOSIS — R51.9 NONINTRACTABLE HEADACHE, UNSPECIFIED CHRONICITY PATTERN, UNSPECIFIED HEADACHE TYPE: Primary | ICD-10-CM

## 2024-07-29 LAB
ALBUMIN SERPL BCP-MCNC: 3.4 G/DL (ref 3.5–5.2)
ALP SERPL-CCNC: 100 U/L (ref 55–135)
ALT SERPL W/O P-5'-P-CCNC: 39 U/L (ref 10–44)
ANION GAP SERPL CALC-SCNC: 13 MMOL/L (ref 8–16)
AST SERPL-CCNC: 20 U/L (ref 10–40)
BASOPHILS # BLD AUTO: 0.09 K/UL (ref 0–0.2)
BASOPHILS NFR BLD: 0.9 % (ref 0–1.9)
BILIRUB SERPL-MCNC: 0.2 MG/DL (ref 0.1–1)
BUN SERPL-MCNC: 18 MG/DL (ref 6–20)
CALCIUM SERPL-MCNC: 9.8 MG/DL (ref 8.7–10.5)
CHLORIDE SERPL-SCNC: 106 MMOL/L (ref 95–110)
CO2 SERPL-SCNC: 22 MMOL/L (ref 23–29)
CREAT SERPL-MCNC: 0.9 MG/DL (ref 0.5–1.4)
DIFFERENTIAL METHOD BLD: ABNORMAL
EOSINOPHIL # BLD AUTO: 0.3 K/UL (ref 0–0.5)
EOSINOPHIL NFR BLD: 3.5 % (ref 0–8)
ERYTHROCYTE [DISTWIDTH] IN BLOOD BY AUTOMATED COUNT: 12.9 % (ref 11.5–14.5)
EST. GFR  (NO RACE VARIABLE): >60 ML/MIN/1.73 M^2
GLUCOSE SERPL-MCNC: 92 MG/DL (ref 70–110)
HCT VFR BLD AUTO: 37.8 % (ref 37–48.5)
HGB BLD-MCNC: 12.2 G/DL (ref 12–16)
IMM GRANULOCYTES # BLD AUTO: 0.19 K/UL (ref 0–0.04)
IMM GRANULOCYTES NFR BLD AUTO: 2 % (ref 0–0.5)
LYMPHOCYTES # BLD AUTO: 2.4 K/UL (ref 1–4.8)
LYMPHOCYTES NFR BLD: 24.9 % (ref 18–48)
MCH RBC QN AUTO: 29.3 PG (ref 27–31)
MCHC RBC AUTO-ENTMCNC: 32.3 G/DL (ref 32–36)
MCV RBC AUTO: 91 FL (ref 82–98)
MONOCYTES # BLD AUTO: 0.8 K/UL (ref 0.3–1)
MONOCYTES NFR BLD: 8.5 % (ref 4–15)
NEUTROPHILS # BLD AUTO: 5.9 K/UL (ref 1.8–7.7)
NEUTROPHILS NFR BLD: 60.2 % (ref 38–73)
NRBC BLD-RTO: 0 /100 WBC
PLATELET # BLD AUTO: 484 K/UL (ref 150–450)
PMV BLD AUTO: 9.5 FL (ref 9.2–12.9)
POTASSIUM SERPL-SCNC: 4.2 MMOL/L (ref 3.5–5.1)
PROT SERPL-MCNC: 7.5 G/DL (ref 6–8.4)
RBC # BLD AUTO: 4.17 M/UL (ref 4–5.4)
SODIUM SERPL-SCNC: 141 MMOL/L (ref 136–145)
WBC # BLD AUTO: 9.72 K/UL (ref 3.9–12.7)

## 2024-07-29 PROCEDURE — 96375 TX/PRO/DX INJ NEW DRUG ADDON: CPT

## 2024-07-29 PROCEDURE — 85025 COMPLETE CBC W/AUTO DIFF WBC: CPT

## 2024-07-29 PROCEDURE — 99284 EMERGENCY DEPT VISIT MOD MDM: CPT | Mod: 24,,, | Performed by: OBSTETRICS & GYNECOLOGY

## 2024-07-29 PROCEDURE — 96374 THER/PROPH/DIAG INJ IV PUSH: CPT

## 2024-07-29 PROCEDURE — 99284 EMERGENCY DEPT VISIT MOD MDM: CPT | Mod: 25

## 2024-07-29 PROCEDURE — 80053 COMPREHEN METABOLIC PANEL: CPT

## 2024-07-29 PROCEDURE — 63600175 PHARM REV CODE 636 W HCPCS

## 2024-07-29 RX ORDER — KETOROLAC TROMETHAMINE 10 MG/1
10 TABLET, FILM COATED ORAL ONCE
Status: DISCONTINUED | OUTPATIENT
Start: 2024-07-29 | End: 2024-07-29

## 2024-07-29 RX ORDER — KETOROLAC TROMETHAMINE 30 MG/ML
15 INJECTION, SOLUTION INTRAMUSCULAR; INTRAVENOUS ONCE
Status: COMPLETED | OUTPATIENT
Start: 2024-07-29 | End: 2024-07-29

## 2024-07-29 RX ORDER — HYDRALAZINE HYDROCHLORIDE 20 MG/ML
5 INJECTION INTRAMUSCULAR; INTRAVENOUS ONCE
Status: COMPLETED | OUTPATIENT
Start: 2024-07-29 | End: 2024-07-29

## 2024-07-29 RX ORDER — SILVER NITRATE 38.21; 12.74 MG/1; MG/1
1 STICK TOPICAL ONCE
Status: DISCONTINUED | OUTPATIENT
Start: 2024-07-29 | End: 2024-07-29

## 2024-07-29 RX ORDER — KETOROLAC TROMETHAMINE 30 MG/ML
15 INJECTION, SOLUTION INTRAMUSCULAR; INTRAVENOUS ONCE
Status: DISCONTINUED | OUTPATIENT
Start: 2024-07-29 | End: 2024-07-29

## 2024-07-29 RX ORDER — NIFEDIPINE 30 MG/1
30 TABLET, EXTENDED RELEASE ORAL DAILY
Status: DISCONTINUED | OUTPATIENT
Start: 2024-07-30 | End: 2024-07-29 | Stop reason: HOSPADM

## 2024-07-29 RX ADMIN — HYDRALAZINE HYDROCHLORIDE 5 MG: 20 INJECTION INTRAMUSCULAR; INTRAVENOUS at 04:07

## 2024-07-29 RX ADMIN — KETOROLAC TROMETHAMINE 15 MG: 30 INJECTION, SOLUTION INTRAMUSCULAR at 04:07

## 2024-07-29 NOTE — TELEPHONE ENCOUNTER
High BP Reading Alert  Received: Today  System-Generated Message  Leti Abrams MD; P Jose Alberto RAMSAY Staff  Arianna Kraft submitted a reading of 148/91 at 7/29/2024 12:33 PM.          Blood Pressure   mmHg · Weekly Means · Since Jan 31, 2024 (6 mos)   Threshold: ?140/90 58%                           Review Flowsheet   More data exists   7/29/2024 7/27/2024 7/26/2024 7/7/2024 5/29/2024                   Measurement   Systolic Blood Pressure 141 Abnormally high   148 Abnormally high  144 Abnormally high  144 Abnormally high  138  141 Abnormally high  133   Diastolic Blood Pressure 86  91 Abnormally high  87 101 Abnormally high  87  86 83   Pulse 70  73 62 68 86  87 71    Abnormally high   148 Abnormally high  144 Abnormally high  144 Abnormally high  138  141 Abnormally high  133   DBP 86  91 Abnormally high  87 101 Abnormally high  87  86 83   Pulse (bpm) 70  73 62 68 86  87 71         Ale

## 2024-07-30 ENCOUNTER — TELEPHONE (OUTPATIENT)
Facility: CLINIC | Age: 39
End: 2024-07-30
Payer: COMMERCIAL

## 2024-07-30 RX ORDER — NIFEDIPINE 30 MG/1
30 TABLET, EXTENDED RELEASE ORAL DAILY
Qty: 30 TABLET | Refills: 1 | Status: SHIPPED | OUTPATIENT
Start: 2024-07-30 | End: 2025-07-30

## 2024-07-31 ENCOUNTER — TELEPHONE (OUTPATIENT)
Dept: OBSTETRICS AND GYNECOLOGY | Facility: CLINIC | Age: 39
End: 2024-07-31
Payer: COMMERCIAL

## 2024-07-31 NOTE — TELEPHONE ENCOUNTER
System-Generated Message  Leti Abrams MD; P Jose Alberto RAMSAY Staff  Arianna Kraft submitted a reading of 137/90 at 7/31/2024  1:27 PM.

## 2024-08-02 ENCOUNTER — POSTPARTUM VISIT (OUTPATIENT)
Dept: OBSTETRICS AND GYNECOLOGY | Facility: CLINIC | Age: 39
End: 2024-08-02
Payer: COMMERCIAL

## 2024-08-02 ENCOUNTER — LAB VISIT (OUTPATIENT)
Dept: LAB | Facility: OTHER | Age: 39
End: 2024-08-02
Payer: COMMERCIAL

## 2024-08-02 VITALS — DIASTOLIC BLOOD PRESSURE: 94 MMHG | SYSTOLIC BLOOD PRESSURE: 130 MMHG | WEIGHT: 199.19 LBS | BODY MASS INDEX: 31.2 KG/M2

## 2024-08-02 LAB
ALBUMIN SERPL BCP-MCNC: 3.6 G/DL (ref 3.5–5.2)
ALP SERPL-CCNC: 103 U/L (ref 55–135)
ALT SERPL W/O P-5'-P-CCNC: 36 U/L (ref 10–44)
ANION GAP SERPL CALC-SCNC: 11 MMOL/L (ref 8–16)
AST SERPL-CCNC: 24 U/L (ref 10–40)
BASOPHILS # BLD AUTO: 0.11 K/UL (ref 0–0.2)
BASOPHILS NFR BLD: 0.9 % (ref 0–1.9)
BILIRUB SERPL-MCNC: 0.2 MG/DL (ref 0.1–1)
BUN SERPL-MCNC: 20 MG/DL (ref 6–20)
CALCIUM SERPL-MCNC: 9.3 MG/DL (ref 8.7–10.5)
CHLORIDE SERPL-SCNC: 105 MMOL/L (ref 95–110)
CO2 SERPL-SCNC: 22 MMOL/L (ref 23–29)
CREAT SERPL-MCNC: 0.9 MG/DL (ref 0.5–1.4)
DIFFERENTIAL METHOD BLD: ABNORMAL
EOSINOPHIL # BLD AUTO: 0.3 K/UL (ref 0–0.5)
EOSINOPHIL NFR BLD: 2.1 % (ref 0–8)
ERYTHROCYTE [DISTWIDTH] IN BLOOD BY AUTOMATED COUNT: 13 % (ref 11.5–14.5)
EST. GFR  (NO RACE VARIABLE): >60 ML/MIN/1.73 M^2
GLUCOSE SERPL-MCNC: 92 MG/DL (ref 70–110)
HCT VFR BLD AUTO: 38.3 % (ref 37–48.5)
HGB BLD-MCNC: 12.6 G/DL (ref 12–16)
IMM GRANULOCYTES # BLD AUTO: 0.1 K/UL (ref 0–0.04)
IMM GRANULOCYTES NFR BLD AUTO: 0.8 % (ref 0–0.5)
LYMPHOCYTES # BLD AUTO: 2.4 K/UL (ref 1–4.8)
LYMPHOCYTES NFR BLD: 19.1 % (ref 18–48)
MCH RBC QN AUTO: 29.2 PG (ref 27–31)
MCHC RBC AUTO-ENTMCNC: 32.9 G/DL (ref 32–36)
MCV RBC AUTO: 89 FL (ref 82–98)
MONOCYTES # BLD AUTO: 0.8 K/UL (ref 0.3–1)
MONOCYTES NFR BLD: 6 % (ref 4–15)
NEUTROPHILS # BLD AUTO: 9 K/UL (ref 1.8–7.7)
NEUTROPHILS NFR BLD: 71.1 % (ref 38–73)
NRBC BLD-RTO: 0 /100 WBC
PLATELET # BLD AUTO: 474 K/UL (ref 150–450)
PMV BLD AUTO: 9.6 FL (ref 9.2–12.9)
POTASSIUM SERPL-SCNC: 3.9 MMOL/L (ref 3.5–5.1)
PROT SERPL-MCNC: 7.4 G/DL (ref 6–8.4)
RBC # BLD AUTO: 4.31 M/UL (ref 4–5.4)
SODIUM SERPL-SCNC: 138 MMOL/L (ref 136–145)
WBC # BLD AUTO: 12.66 K/UL (ref 3.9–12.7)

## 2024-08-02 PROCEDURE — 99999 PR PBB SHADOW E&M-EST. PATIENT-LVL III: CPT | Mod: PBBFAC,,,

## 2024-08-02 PROCEDURE — 36415 COLL VENOUS BLD VENIPUNCTURE: CPT

## 2024-08-02 PROCEDURE — 80053 COMPREHEN METABOLIC PANEL: CPT

## 2024-08-02 PROCEDURE — 85025 COMPLETE CBC W/AUTO DIFF WBC: CPT

## 2024-08-02 PROCEDURE — 0503F POSTPARTUM CARE VISIT: CPT | Mod: CPTII,S$GLB,,

## 2024-08-03 ENCOUNTER — HOSPITAL ENCOUNTER (EMERGENCY)
Facility: OTHER | Age: 39
Discharge: HOME | End: 2024-08-03
Attending: OBSTETRICS & GYNECOLOGY
Payer: COMMERCIAL

## 2024-08-03 VITALS
DIASTOLIC BLOOD PRESSURE: 69 MMHG | RESPIRATION RATE: 18 BRPM | TEMPERATURE: 98 F | SYSTOLIC BLOOD PRESSURE: 145 MMHG | OXYGEN SATURATION: 100 % | HEART RATE: 71 BPM

## 2024-08-03 DIAGNOSIS — R51.9 INTRACTABLE HEADACHE, UNSPECIFIED CHRONICITY PATTERN, UNSPECIFIED HEADACHE TYPE: Primary | ICD-10-CM

## 2024-08-03 LAB
ALBUMIN SERPL BCP-MCNC: 3.5 G/DL (ref 3.5–5.2)
ALP SERPL-CCNC: 104 U/L (ref 55–135)
ALT SERPL W/O P-5'-P-CCNC: 37 U/L (ref 10–44)
ANION GAP SERPL CALC-SCNC: 9 MMOL/L (ref 8–16)
AST SERPL-CCNC: 23 U/L (ref 10–40)
BASOPHILS # BLD AUTO: 0.11 K/UL (ref 0–0.2)
BASOPHILS NFR BLD: 0.8 % (ref 0–1.9)
BILIRUB SERPL-MCNC: 0.3 MG/DL (ref 0.1–1)
BUN SERPL-MCNC: 14 MG/DL (ref 6–20)
CALCIUM SERPL-MCNC: 9.2 MG/DL (ref 8.7–10.5)
CHLORIDE SERPL-SCNC: 108 MMOL/L (ref 95–110)
CO2 SERPL-SCNC: 21 MMOL/L (ref 23–29)
CREAT SERPL-MCNC: 0.8 MG/DL (ref 0.5–1.4)
DIFFERENTIAL METHOD BLD: ABNORMAL
EOSINOPHIL # BLD AUTO: 0.3 K/UL (ref 0–0.5)
EOSINOPHIL NFR BLD: 2.2 % (ref 0–8)
ERYTHROCYTE [DISTWIDTH] IN BLOOD BY AUTOMATED COUNT: 13.2 % (ref 11.5–14.5)
EST. GFR  (NO RACE VARIABLE): >60 ML/MIN/1.73 M^2
GLUCOSE SERPL-MCNC: 92 MG/DL (ref 70–110)
HCT VFR BLD AUTO: 40.7 % (ref 37–48.5)
HGB BLD-MCNC: 13.3 G/DL (ref 12–16)
IMM GRANULOCYTES # BLD AUTO: 0.08 K/UL (ref 0–0.04)
IMM GRANULOCYTES NFR BLD AUTO: 0.6 % (ref 0–0.5)
LYMPHOCYTES # BLD AUTO: 1.8 K/UL (ref 1–4.8)
LYMPHOCYTES NFR BLD: 13.7 % (ref 18–48)
MCH RBC QN AUTO: 29.2 PG (ref 27–31)
MCHC RBC AUTO-ENTMCNC: 32.7 G/DL (ref 32–36)
MCV RBC AUTO: 89 FL (ref 82–98)
MONOCYTES # BLD AUTO: 0.8 K/UL (ref 0.3–1)
MONOCYTES NFR BLD: 5.9 % (ref 4–15)
NEUTROPHILS # BLD AUTO: 10.2 K/UL (ref 1.8–7.7)
NEUTROPHILS NFR BLD: 76.8 % (ref 38–73)
NRBC BLD-RTO: 0 /100 WBC
PLATELET # BLD AUTO: 448 K/UL (ref 150–450)
PMV BLD AUTO: 9.9 FL (ref 9.2–12.9)
POTASSIUM SERPL-SCNC: 4.1 MMOL/L (ref 3.5–5.1)
PROT SERPL-MCNC: 7.2 G/DL (ref 6–8.4)
RBC # BLD AUTO: 4.56 M/UL (ref 4–5.4)
SODIUM SERPL-SCNC: 138 MMOL/L (ref 136–145)
WBC # BLD AUTO: 13.32 K/UL (ref 3.9–12.7)

## 2024-08-03 PROCEDURE — 96372 THER/PROPH/DIAG INJ SC/IM: CPT | Performed by: OBSTETRICS & GYNECOLOGY

## 2024-08-03 PROCEDURE — 96375 TX/PRO/DX INJ NEW DRUG ADDON: CPT

## 2024-08-03 PROCEDURE — 99284 EMERGENCY DEPT VISIT MOD MDM: CPT | Mod: 25

## 2024-08-03 PROCEDURE — 25000003 PHARM REV CODE 250

## 2024-08-03 PROCEDURE — 96374 THER/PROPH/DIAG INJ IV PUSH: CPT

## 2024-08-03 PROCEDURE — 63600175 PHARM REV CODE 636 W HCPCS

## 2024-08-03 PROCEDURE — 85025 COMPLETE CBC W/AUTO DIFF WBC: CPT

## 2024-08-03 PROCEDURE — 80053 COMPREHEN METABOLIC PANEL: CPT

## 2024-08-03 PROCEDURE — 63600175 PHARM REV CODE 636 W HCPCS: Performed by: OBSTETRICS & GYNECOLOGY

## 2024-08-03 RX ORDER — DIPHENHYDRAMINE HYDROCHLORIDE 50 MG/ML
25 INJECTION INTRAMUSCULAR; INTRAVENOUS ONCE
Status: COMPLETED | OUTPATIENT
Start: 2024-08-03 | End: 2024-08-03

## 2024-08-03 RX ORDER — LABETALOL 200 MG/1
200 TABLET, FILM COATED ORAL 2 TIMES DAILY
Qty: 60 TABLET | Refills: 1 | Status: SHIPPED | OUTPATIENT
Start: 2024-08-03 | End: 2025-08-03

## 2024-08-03 RX ORDER — BUTALBITAL, ACETAMINOPHEN AND CAFFEINE 50; 325; 40 MG/1; MG/1; MG/1
1 TABLET ORAL EVERY 4 HOURS PRN
Qty: 20 TABLET | Refills: 0 | Status: SHIPPED | OUTPATIENT
Start: 2024-08-03 | End: 2024-09-02

## 2024-08-03 RX ORDER — LABETALOL 200 MG/1
200 TABLET, FILM COATED ORAL ONCE
Status: COMPLETED | OUTPATIENT
Start: 2024-08-03 | End: 2024-08-03

## 2024-08-03 RX ORDER — BUTALBITAL, ACETAMINOPHEN AND CAFFEINE 50; 325; 40 MG/1; MG/1; MG/1
1 TABLET ORAL EVERY 4 HOURS PRN
Qty: 20 TABLET | Refills: 0 | Status: SHIPPED | OUTPATIENT
Start: 2024-08-03 | End: 2024-08-03

## 2024-08-03 RX ORDER — METOCLOPRAMIDE HYDROCHLORIDE 5 MG/ML
10 INJECTION INTRAMUSCULAR; INTRAVENOUS ONCE
Status: COMPLETED | OUTPATIENT
Start: 2024-08-03 | End: 2024-08-03

## 2024-08-03 RX ORDER — SUMATRIPTAN 6 MG/.5ML
6 INJECTION, SOLUTION SUBCUTANEOUS ONCE
Status: COMPLETED | OUTPATIENT
Start: 2024-08-03 | End: 2024-08-03

## 2024-08-03 RX ADMIN — LABETALOL HYDROCHLORIDE 200 MG: 200 TABLET, FILM COATED ORAL at 12:08

## 2024-08-03 RX ADMIN — METOCLOPRAMIDE 10 MG: 5 INJECTION, SOLUTION INTRAMUSCULAR; INTRAVENOUS at 10:08

## 2024-08-03 RX ADMIN — DIPHENHYDRAMINE HYDROCHLORIDE 25 MG: 50 INJECTION INTRAMUSCULAR; INTRAVENOUS at 10:08

## 2024-08-03 RX ADMIN — SUMATRIPTAN 6 MG: 6 INJECTION, SOLUTION SUBCUTANEOUS at 11:08

## 2024-08-03 NOTE — ED NOTES
Patient presents to [facility] with c/o headache 6/10. Vaginal delivery 7/20/24. Gestational HTN during pregnancy. Blood pressure via connected mom this morning 145/84 and 154/102. Patient states she took prescribed procardia and ibuprofen this morning.

## 2024-08-03 NOTE — DISCHARGE INSTRUCTIONS
Stop taking procardia  Take one pill twice daily -labatalol   Call clinic [phone] or L & D after hours at [phone] for headache not relieved by Tylenol, blurry vision, or temp of 100.4 or greater.    Keep next clinic appointment

## 2024-08-03 NOTE — ED PROVIDER NOTES
Encounter Date: 8/3/2024       History     Chief Complaint   Patient presents with    Headache    Hypertension     Arianna Kraft is a 39 y.o. [id number] at PPD#14 s/p  presents complaining of headache.     She was seen in the [facility] on  for elevated BP and HA. She was prescribed procardia 30 and recommended to take ibuprofen with the procardia. The HA is not postural. She has no associated visual changes, SOB, RUQ pain. She has been well hydrated. She has taken excedrin, ibuprofen, and tylenol without relief. She had a mild range BP yesterday at her BP check and pre-eclampsia labs were drawn. Results were WNL.      This IUP was complicated by gHTN, AMA.            Review of patient's allergies indicates:  No Known Allergies  Past Medical History:   Diagnosis Date    Abnormal Pap smear of cervix      No past surgical history on file.  Family History   Problem Relation Name Age of Onset    Breast cancer Paternal Aunt      Colon cancer Neg Hx      Cancer Neg Hx      Diabetes Neg Hx      Eclampsia Neg Hx      Hypertension Neg Hx      Miscarriages / Stillbirths Neg Hx      Ovarian cancer Neg Hx       labor Neg Hx      Stroke Neg Hx       Social History     Tobacco Use    Smoking status: Never    Smokeless tobacco: Never   Substance Use Topics    Alcohol use: Yes     Comment: socially    Drug use: Never     Review of Systems   Constitutional:  Negative for fever.   HENT:  Negative for sore throat.    Eyes:  Negative for visual disturbance.   Respiratory:  Negative for shortness of breath.    Cardiovascular:  Negative for chest pain and leg swelling.   Gastrointestinal:  Negative for abdominal pain, nausea and vomiting.   Genitourinary:  Negative for dysuria and vaginal bleeding.   Musculoskeletal:  Negative for back pain.   Skin:  Negative for rash.   Neurological:  Positive for headaches. Negative for weakness.   Hematological:  Does not bruise/bleed easily.   Psychiatric/Behavioral: Negative.          Physical Exam     Initial Vitals   BP Pulse Resp Temp SpO2   08/03/24 0955 08/03/24 0955 08/03/24 0956 08/03/24 0956 08/03/24 0955   133/83 84 18 98.2 °F (36.8 °C) 97 %      MAP       --                Physical Exam    Vitals reviewed.  Constitutional: She appears well-developed and well-nourished. She is not diaphoretic. No distress.   HENT:   Head: Normocephalic and atraumatic.   Cardiovascular:  Normal rate.           Pulmonary/Chest: No respiratory distress.   Abdominal: Abdomen is soft. There is no abdominal tenderness. There is no rebound.   Musculoskeletal:         General: No edema. Normal range of motion.     Neurological: She is alert and oriented to person, place, and time.   Skin: Skin is warm and dry.   Psychiatric: She has a normal mood and affect. Her behavior is normal. Judgment and thought content normal.         ED Course   Procedures  Labs Reviewed   CBC W/ AUTO DIFFERENTIAL - Abnormal       Result Value    WBC 13.32 (*)     RBC 4.56      Hemoglobin 13.3      Hematocrit 40.7      MCV 89      MCH 29.2      MCHC 32.7      RDW 13.2      Platelets 448      MPV 9.9      Immature Granulocytes 0.6 (*)     Gran # (ANC) 10.2 (*)     Immature Grans (Abs) 0.08 (*)     Lymph # 1.8      Mono # 0.8      Eos # 0.3      Baso # 0.11      nRBC 0      Gran % 76.8 (*)     Lymph % 13.7 (*)     Mono % 5.9      Eosinophil % 2.2      Basophil % 0.8      Differential Method Automated     COMPREHENSIVE METABOLIC PANEL - Abnormal    Sodium 138      Potassium 4.1      Chloride 108      CO2 21 (*)     Glucose 92      BUN 14      Creatinine 0.8      Calcium 9.2      Total Protein 7.2      Albumin 3.5      Total Bilirubin 0.3      Alkaline Phosphatase 104      AST 23      ALT 37      eGFR >60      Anion Gap 9            Imaging Results    None          Medications   diphenhydrAMINE injection 25 mg (25 mg Intravenous Given 8/3/24 1053)   metoclopramide injection 10 mg (10 mg Intravenous Given 8/3/24 1053)   SUMAtriptan  succinate injection 6 mg (6 mg Subcutaneous Given 8/3/24 1152)   labetaloL tablet 200 mg (200 mg Oral Given 8/3/24 1249)     Medical Decision Making  39 y.o. [id number] at PPD#14 s/p  presents complaining of headache.     Temp:  [98.2 °F (36.8 °C)] 98.2 °F (36.8 °C)  Pulse:  [56-84] 71  Resp:  [18] 18  SpO2:  [95 %-100 %] 100 %  BP: (132-155)/(67-86) 145/69     Headache  - Denies visual changes, SOB, RUQ pain  - 7/10 on admit   - Gave IV benadryl/reglan without relief  - Resolution with sumatriptan  - Rx fioricet for outpatient management and changed BP med from procardia 30 to labetalol 200 BID    Elevated BP   - H/o cHTN   - Mild range BP in [facility]  - Repeat pre-E labs  - CBC: Plt 448  - CMP: Cr 0.8, AST 23, ALT 37  - Labetalol 200 mg given x1 in [facility]  - Switch outpatient HTN med from procardia 30 to labetalol 200 BID    Amount and/or Complexity of Data Reviewed  Labs: ordered.    Risk  Prescription drug management.              Attending Attestation:   Physician Attestation Statement for Resident:  As the supervising MD   Physician Attestation Statement: I have personally seen and examined this patient.   I agree with the above history.  -:   As the supervising MD I agree with the above PE.     As the supervising MD I agree with the above treatment, course, plan, and disposition.   -:   See ED course.  HA resolved with sumatriptan.  Normal BP in the [facility].  Meds switched to labetalol due to HA.  Agree with discharge to home.  F/U with OB in 1 week.    Shahida Light MD,STACEY  Date and Time: 2024 12:32 PM  OB Hospitalist  I was personally present during the critical portions of the procedure(s) performed by the resident and was immediately available in the ED to provide services and assistance as needed during the entire procedure.   I have reviewed the following: old records at this facility.                ED Course as of 24 1233   Sat Aug 03, 2024   1140 Patient is postpartum with elevated blood  pressure being treated with Procardia XL 30 mg p.o. q.d..  Patient reports having a severe headache this morning that has not improved with Tylenol, IV Benadryl and Reglan.  Blood pressures in the OB ED have been normal, no mild range blood pressures.  Pre E labs pending.  We will try subcu sumatriptan and if headache not improved may require imaging.  Discussed plan with patient and she voiced understanding. [CD]      ED Course User Index  [CD] Shahida Light MD                           Clinical Impression:  Final diagnoses:  [R51.9] Intractable headache, unspecified chronicity pattern, unspecified headache type (Primary)  [O13.5] Gestational hypertension without significant proteinuria, postpartum  [Z39.2] Postpartum state          ED Disposition Condition    Discharge Stable          ED Prescriptions       Medication Sig Dispense Start Date End Date Auth. Provider    labetaloL (NORMODYNE) 200 MG tablet Take 1 tablet (200 mg total) by mouth 2 (two) times daily. 60 tablet 8/3/2024 8/3/2025 Nancie Barron MD    butalbital-acetaminophen-caffeine -40 mg (FIORICET, ESGIC) -40 mg per tablet  (Status: Discontinued) Take 1 tablet by mouth every 4 (four) hours as needed for Headaches. 20 tablet 8/3/2024 8/3/2024 Nancie Barron MD    butalbital-acetaminophen-caffeine -40 mg (FIORICET, ESGIC) -40 mg per tablet Take 1 tablet by mouth every 4 (four) hours as needed for Headaches. 20 tablet 8/3/2024 9/2/2024 Lamont Barber MD          Follow-up Information       Follow up With Specialties Details Why Contact Info    Ronit Santo MD Obstetrics and Gynecology In 3 days BP check [address]  [address]  [facility] [place] [facility]  [facility] [id number]  [phone]          Nancie Barron MD  Obstetrics & Gynecology, PGY-1      Nancie Barron MD  Resident  08/04/24 2993       Shahida Light MD  08/05/24 5914

## 2024-08-05 ENCOUNTER — TELEPHONE (OUTPATIENT)
Dept: OBSTETRICS AND GYNECOLOGY | Facility: CLINIC | Age: 39
End: 2024-08-05
Payer: COMMERCIAL

## 2024-08-05 ENCOUNTER — CLINICAL SUPPORT (OUTPATIENT)
Facility: CLINIC | Age: 39
End: 2024-08-05
Payer: COMMERCIAL

## 2024-08-05 VITALS
OXYGEN SATURATION: 99 % | DIASTOLIC BLOOD PRESSURE: 93 MMHG | HEART RATE: 95 BPM | RESPIRATION RATE: 16 BRPM | TEMPERATURE: 99 F | SYSTOLIC BLOOD PRESSURE: 147 MMHG

## 2024-08-07 ENCOUNTER — POSTPARTUM VISIT (OUTPATIENT)
Dept: OBSTETRICS AND GYNECOLOGY | Facility: CLINIC | Age: 39
End: 2024-08-07
Payer: COMMERCIAL

## 2024-08-07 VITALS
BODY MASS INDEX: 30.92 KG/M2 | WEIGHT: 197 LBS | HEIGHT: 67 IN | SYSTOLIC BLOOD PRESSURE: 132 MMHG | DIASTOLIC BLOOD PRESSURE: 86 MMHG

## 2024-08-07 PROCEDURE — 0503F POSTPARTUM CARE VISIT: CPT | Mod: CPTII,S$GLB,,

## 2024-08-07 PROCEDURE — 99999 PR PBB SHADOW E&M-EST. PATIENT-LVL III: CPT | Mod: PBBFAC,,,

## 2024-09-01 NOTE — PROGRESS NOTES
Subjective:      Arianna Kraft is a 39 y.o.  who presents for a postpartum visit.  She is status post  uncomplicated vaginal delivery 1 weeks ago.  Her hospitalization was complicated by hypertension. Here today for 1 week BP check. Taking Nifedipine 30 mg XL daily. Reports woke up with some discomfort and took /101. Mild headache resolved with Tylenol and rest. Denies visual changes or RUQ pain.  She is breastfeeding.   She reports mood is good with only anxiety regarding BP. No depressive symptoms.      Denies pain, abnormal vaginal bleeding or discharge. Normal bladder and bowel function. Baby doing well.    Past Medical History:   Diagnosis Date    Abnormal Pap smear of cervix        Current Outpatient Medications:     doxylamine succinate (UNISOM, DOXYLAMINE, ORAL), Take by mouth., Disp: , Rfl:     prenatal vit no.124/iron/folic (PRENATAL VITAMIN ORAL), Take by mouth., Disp: , Rfl:     butalbital-acetaminophen-caffeine -40 mg (FIORICET, ESGIC) -40 mg per tablet, Take 1 tablet by mouth every 4 (four) hours as needed for Headaches., Disp: 20 tablet, Rfl: 0    labetaloL (NORMODYNE) 200 MG tablet, Take 1 tablet (200 mg total) by mouth 2 (two) times daily., Disp: 60 tablet, Rfl: 1    NIFEdipine (PROCARDIA-XL) 30 MG (OSM) 24 hr tablet, Take 1 tablet (30 mg total) by mouth once daily., Disp: 30 tablet, Rfl: 1      Objective:     Vitals:    24 1134   BP: 138/72       APPEARANCE: Well nourished, well developed, in no acute distress.  PSYCH: Appropriate mood and affect.  NECK:  Supple, no thyromegaly.  BREASTS:  No masses, no nipple discharge.  CARDIOVASCULAR:  Regular rate and rhythm.  LUNGS:  Clear to auscultation bilaterally.  ABDOMEN:  Soft, nontender.  Pfannenstiel incision C/D/I, healing well.    GENITOURINARY:  Deferred.  EXTREMITIES: No edema.    Postpartum depression score: 5    Assessment:     Postpartum care following vaginal delivery    Postpartum hypertension  Home  measurement elevated this AM with mild headache resolved with rest/Tylenol. Normal BP here in clinic. Will get labs to r/o any concerns for PreE. Reviewed VIPIN precautions. Continue Nifedipine and current dose as only one elevation since discharge.  -     CBC Auto Differential; Future; Expected date: 07/26/2024  -     Comprehensive Metabolic Panel; Future; Expected date: 07/26/2024      Plan:     1. Postpartum course reviewed and discussed with patient.  All questions answered.  Return to clinic in 4 weeks for postpartum visit.        Ronit Santo MD

## 2024-09-03 ENCOUNTER — PATIENT MESSAGE (OUTPATIENT)
Dept: OBSTETRICS AND GYNECOLOGY | Facility: CLINIC | Age: 39
End: 2024-09-03

## 2024-09-03 ENCOUNTER — POSTPARTUM VISIT (OUTPATIENT)
Dept: OBSTETRICS AND GYNECOLOGY | Facility: CLINIC | Age: 39
End: 2024-09-03
Payer: COMMERCIAL

## 2024-09-03 VITALS
BODY MASS INDEX: 31.38 KG/M2 | SYSTOLIC BLOOD PRESSURE: 102 MMHG | HEIGHT: 67 IN | DIASTOLIC BLOOD PRESSURE: 60 MMHG | WEIGHT: 199.94 LBS

## 2024-09-03 DIAGNOSIS — L92.9 GRANULATION TISSUE AT OBSTETRICAL LACERATION SITE: ICD-10-CM

## 2024-09-03 DIAGNOSIS — Z30.09 ENCOUNTER FOR OTHER GENERAL COUNSELING AND ADVICE ON CONTRACEPTION: ICD-10-CM

## 2024-09-03 PROCEDURE — 0503F POSTPARTUM CARE VISIT: CPT | Mod: CPTII,S$GLB,, | Performed by: OBSTETRICS & GYNECOLOGY

## 2024-09-03 PROCEDURE — 99999 PR PBB SHADOW E&M-EST. PATIENT-LVL III: CPT | Mod: PBBFAC,,, | Performed by: OBSTETRICS & GYNECOLOGY

## 2024-09-03 RX ORDER — DROSPIRENONE AND ETHINYL ESTRADIOL 0.02-3(28)
1 KIT ORAL DAILY
Qty: 28 TABLET | Refills: 11 | Status: SHIPPED | OUTPATIENT
Start: 2024-09-03

## 2024-09-03 RX ORDER — ESTRADIOL 0.1 MG/G
1 CREAM VAGINAL DAILY
Qty: 42.5 G | Refills: 1 | Status: SHIPPED | OUTPATIENT
Start: 2024-09-03 | End: 2024-09-04 | Stop reason: SDUPTHER

## 2024-09-03 NOTE — PROGRESS NOTES
"  Subjective:      Arianna Kraft is a 39 y.o.  who presents for a postpartum visit.  She is status post  vaginal delivery complicated by post partum HTN  8 weeks ago. Currently on labetalol 200 mg BID and has been doing well. She is formula feeding.  She desires oral contraceptives (estrogen/progesterone) for contraception.  She denies signs and symptoms of postpartum depression. PPD score 9.     Her last pap was NILM, HPV negative on 2023      Review the Delivery Report for details.     Objective:     /60   Ht 5' 7" (1.702 m)   Wt 90.7 kg (199 lb 15.3 oz)   Breastfeeding No   BMI 31.32 kg/m²     General: Alert and no distress  Abdomen: soft, benign  External Genitalia: normal, well-healed, without lesions or masses  Vagina: small granulation tissue bilaterally (<4 mm diameter for both lesions) within the vaginal vault.    Assessment:     Postpartum care and examination    Postpartum hypertension       -      Resolving.        -      Drop PM labetalol dose x 1 week while monitoring BP BID. Pt to send in BP results after 1-2 weeks. At that point will likely be able to stop labetalol all together.     Granulation tissue at obstetrical laceration site  -     estradioL (ESTRACE) 0.01 % (0.1 mg/gram) vaginal cream; Place 1 g vaginally once daily.  Dispense: 42.5 g; Refill: 1    Encounter for other general counseling and advice on contraception  -     drospirenone-ethinyl estradioL (KELSIE) 3-0.02 mg per tablet; Take 1 tablet by mouth once daily.  Dispense: 28 tablet; Refill: 11    Plan:     1. Return to clinic in 3-4 months for annual exam      After discussing the risks, benefits, and alternatives, Arianna Kraft has opted to begin contraceptive treatment with oral contraceptive pills.   Today's discussion included:  1. When to initiate pills.  2. The need for regular compliance to ensure adequate contraceptive effect.  3. Potential minor side effects such as breakthrough spotting, nausea, " breast tenderness, weight changes, acne, headaches, etc.   4. Potential though less likely major side effects such as MI, stroke, and deep vein thrombosis. She has been asked to report any signs of such serious problems immediately.      All questions were answered, she voiced understanding, and she wishes to take the medication as prescribed.

## 2024-09-04 RX ORDER — ESTRADIOL 0.1 MG/G
1 CREAM VAGINAL DAILY
Qty: 42.5 G | Refills: 1 | Status: SHIPPED | OUTPATIENT
Start: 2024-09-04 | End: 2025-09-04

## 2024-09-18 ENCOUNTER — PATIENT MESSAGE (OUTPATIENT)
Dept: OBSTETRICS AND GYNECOLOGY | Facility: CLINIC | Age: 39
End: 2024-09-18
Payer: COMMERCIAL

## 2024-09-19 ENCOUNTER — TELEPHONE (OUTPATIENT)
Dept: OBSTETRICS AND GYNECOLOGY | Facility: CLINIC | Age: 39
End: 2024-09-19

## 2024-09-19 ENCOUNTER — CLINICAL SUPPORT (OUTPATIENT)
Dept: REHABILITATION | Facility: HOSPITAL | Age: 39
End: 2024-09-19
Payer: COMMERCIAL

## 2024-09-19 DIAGNOSIS — M62.9 DISORDER OF MUSCLE: Primary | ICD-10-CM

## 2024-09-19 DIAGNOSIS — Z87.81 HISTORY OF HIP FRACTURE: ICD-10-CM

## 2024-09-19 PROCEDURE — 97530 THERAPEUTIC ACTIVITIES: CPT | Mod: PO

## 2024-09-19 PROCEDURE — 97162 PT EVAL MOD COMPLEX 30 MIN: CPT | Mod: PO

## 2024-09-19 NOTE — TELEPHONE ENCOUNTER
Im attaching my blood pressure readings from the last two weeks like you asked for. I had one crazy high one that I freaked out and deleted last night- I think I was stressed/maybe it was a fluke? On that note, I think I need to revisit the anxiety meds possibility. I have been having a hard time managing everything as I get ready to go back to work next week. Let me know what next steps are.
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
## 2024-09-19 NOTE — PATIENT INSTRUCTIONS
Home Exercise Program: 09/19/2024    PERINEAL STRETCH / MASSAGE  1. Wash hands thoroughly with antibacterial soap.  2. Lay down comfortably with your back and head well supported by several pillows.  3. Apply water-based lubricant on your thumb and perineum.  4. Place your thumb just inside the vagina, up to the first knuckle only.  5. Gently press downward, then slowly continue the stretch up both sides of the vaginal opening, holding for 10 seconds at each spot.   6. The amount of pressure for the stretch may cause discomfort, but not pain. Don't go above 4-5/10 pain during or after the exercise.  7. Focus on relaxed breathing and keeping the pelvic floor muscles dropped.   8. Continue for 5-10 minutes, every other day    STOP if there is increased bleeding, an infection, or extreme pain.

## 2024-09-30 RX ORDER — ESCITALOPRAM OXALATE 10 MG/1
10 TABLET ORAL DAILY
Qty: 90 TABLET | Refills: 3 | Status: SHIPPED | OUTPATIENT
Start: 2024-09-30 | End: 2025-09-30

## 2024-10-15 ENCOUNTER — CLINICAL SUPPORT (OUTPATIENT)
Dept: REHABILITATION | Facility: HOSPITAL | Age: 39
End: 2024-10-15
Payer: COMMERCIAL

## 2024-10-15 DIAGNOSIS — M62.9 DISORDER OF MUSCLE: Primary | ICD-10-CM

## 2024-10-15 PROCEDURE — 97140 MANUAL THERAPY 1/> REGIONS: CPT | Mod: PO

## 2024-10-15 PROCEDURE — 97110 THERAPEUTIC EXERCISES: CPT | Mod: PO

## 2024-10-15 NOTE — PROGRESS NOTES
Pelvic Health Physical Therapy   Treatment Note     Name: Arianna Kraft  Clinic Number: [id number]    Therapy Diagnosis:   Encounter Diagnosis   Name Primary?    Disorder of muscle Yes     Physician: Ronit Santo MD    Visit Date: 10/15/2024     Physician Orders: PT Eval and Treat    Medical Diagnosis from Referral: History of hip fracture [Z87.81]   Evaluation Date: 9/19/2024  Authorization Period Expiration: 12/31/2024  Plan of Care Expiration: 12/19/2024  Visit # / Visits authorized: 2/12  Cancelled Visits: 0  No Show Visits: 0    Time In: 9:35  Time Out: 10:20  Total Billable Time: 45 minutes    Precautions: Standard    Subjective     Pt reports: that her scar is still a little painful.  No bleeding noted.  She was compliant with home exercise program.  Response to previous treatment: no adverse effects  Functional change: none noted yet.      Pain: 0/10    Objective     Arianna received therapeutic exercises to develop core strength and flexibility for 15 minutes including:  figure 4 and supine piriformis stretch, frog leg stretch, supine TA set.  We looked at her TA on SEMG- she had difficulty maintaining tension in the TA while breathing in 5 sec contractions.           Arianna received the following manual therapy techniques: to develop desensitization for 30 minutes including: trigger point/myofascial release of B LA and OI  Internal work performed today with pt's verbal consent.  L LA and OI more tender today than R, but pt reported internal work today being much less uncomfortable than last session's exam.       Home Exercises Provided and Patient Education Provided     Education provided:   - posture/body mechanices  Discussed progression of plan of care with patient; educated pt in activity modification; reviewed HEP with pt. Pt demonstrated and verbalized understanding of all instruction and was provided with a handout of HEP (see Patient Instructions).    Written Home Exercises Provided:  yes.  Exercises were reviewed and Arianna was able to demonstrate them prior to the end of the session.  Arianna demonstrated good  understanding of the education provided.     See EMR under Patient Instructions for exercises provided 10/15/2024.    Assessment     Pt tolerated internal work well- will continue core strengthening and introduce Kegels next via SEMG.      Arianna Is progressing well towards her goals.   Pt prognosis is Good.     Pt will continue to benefit from skilled outpatient physical therapy to address the deficits listed in the problem list box on initial evaluation, provide pt/family education and to maximize pt's level of independence in the home and community environment.     Pt's spiritual, cultural and educational needs considered and pt agreeable to plan of care and goals.     Anticipated barriers to physical therapy: none    Goals: Short Term Goals: 2 weeks  Pt to report increased awareness of PFM lift/drop during exercises and functional activities.  Pt to be I with double voiding techniques.  Pt to be I with diaphragmatic breathing.       Long Term Goals: 12 weeks   Pt will report improved ability to perform ADLs (ie. dressing, bathing, functional transfers) with little (drops) to no urinary leakage 7/7 days per week.  Pt will report improved ability to cough/sneeze/laugh/yell with little (drops) to no urinary leakage 7/7 days per week.   Pt will report successfully having intercourse with < or = 2/10 pain for an improvement in activity tolerance.   Pt/family will be independent with HEP for continued self-management of symptoms.   ALL PROGRESSING    Plan     Plan of care Certification: 9/19/2024 to 12/19/2024.     Outpatient Physical Therapy 1 times per 2 week(s)  for 12 weeks to include the following interventions: therapeutic exercises, therapeutic activity, neuromuscular re-education, manual therapy, modalities PRN, patient/family education, and self care/home  management    Meredith Torres, PT, BCB-PMD

## 2024-10-15 NOTE — PATIENT INSTRUCTIONS
10/15/2024    TA set:  lie on your back or on your belly, and draw in your lower belly as if zipping tight pants.  If you are lying on your belly, you will be pulling your belly button away from the bed/floor.  Hold for 5 sec without holding breath, and release for 10 sec.  Repeat 10 times, 2 sets, 1-2 times per day.          Hold stretch 30 sec, repeat 2 times.

## 2024-10-29 ENCOUNTER — CLINICAL SUPPORT (OUTPATIENT)
Dept: REHABILITATION | Facility: HOSPITAL | Age: 39
End: 2024-10-29
Payer: COMMERCIAL

## 2024-10-29 DIAGNOSIS — M62.9 DISORDER OF MUSCLE: Primary | ICD-10-CM

## 2024-10-29 PROCEDURE — 97112 NEUROMUSCULAR REEDUCATION: CPT | Mod: PO

## 2024-10-30 ENCOUNTER — OFFICE VISIT (OUTPATIENT)
Dept: OBSTETRICS AND GYNECOLOGY | Facility: CLINIC | Age: 39
End: 2024-10-30
Payer: COMMERCIAL

## 2024-10-30 DIAGNOSIS — F41.8 POSTPARTUM ANXIETY: Primary | Chronic | ICD-10-CM

## 2024-11-12 ENCOUNTER — CLINICAL SUPPORT (OUTPATIENT)
Dept: REHABILITATION | Facility: HOSPITAL | Age: 39
End: 2024-11-12
Payer: COMMERCIAL

## 2024-11-12 DIAGNOSIS — M62.9 DISORDER OF MUSCLE: Primary | ICD-10-CM

## 2024-11-12 PROCEDURE — 97112 NEUROMUSCULAR REEDUCATION: CPT | Mod: PO

## 2024-11-12 PROCEDURE — 97140 MANUAL THERAPY 1/> REGIONS: CPT | Mod: PO

## 2024-11-12 NOTE — PROGRESS NOTES
Pelvic Health Physical Therapy   Treatment Note     Name: Arianna [facility]  Clinic Number: [id number]    Therapy Diagnosis:   Encounter Diagnosis   Name Primary?    Disorder of muscle Yes     Physician: Ronit Santo MD    Visit Date: 11/12/2024     Physician Orders: PT Eval and Treat    Medical Diagnosis from Referral: History of hip fracture [Z87.81]   Evaluation Date: 9/19/2024  Authorization Period Expiration: 12/31/2024  Plan of Care Expiration: 12/19/2024  Visit # / Visits authorized: 4/12  Cancelled Visits: 0  No Show Visits: 0    Time In: 3:05  Time Out: 3:50  Total Billable Time:  45 minutes    Precautions: Standard    Subjective     Pt reports: less leakage with intercourse; has been working on reverse Kegels but has to pay attention.     She was compliant with home exercise program.  Response to previous treatment: no adverse effects  Functional change: less leakage with intercourse but sensitive area still there.        Pain: 0/10    Objective     Arianna received neuromuscular re-education to develop core strength and control for 30 minutes including: Kegels with assist of SEMG.  We worked in supine with external lead wires.  She demonstrated normal baseline resting, fair WR rise, and good holding in 5 and 10 sec Kegels with complete and timely derecruitment.      We then looked internally where she rec'd 15 minutes of manual therapy to promote tissue extensibility including passive stretching to the introital tissues.  mild hypertonicity was noted, R>L.  She was able to execute a reverse Kegel well.  Most discomfort came from movement of the tissues at the posterior fourchette.  There was no visible granulation tissue, but scar tightness was palpable.  She was instructed in perineal stretching per handout, and we briefly discussed dilator work if perineal stretching alone did not give her relief.  We also talked about using Releveum from [employer] to alleviate symptoms during intercourse.        Home Exercises Provided and Patient Education Provided     Education provided:   - posture/body mechanices  Discussed progression of plan of care with patient; educated pt in activity modification; reviewed HEP with pt. Pt demonstrated and verbalized understanding of all instruction and was provided with a handout of HEP (see Patient Instructions).    Written Home Exercises Provided: yes.  Exercises were reviewed and Arianna was able to demonstrate them prior to the end of the session.  Arianna demonstrated good  understanding of the education provided.     See EMR under Patient Instructions for exercises provided 11/12/2024.    Assessment     Hopefully perineal stretching alone will give her the relief that she wants- if not, her tight pelvic floor muscles may need some downtraining with the help of a dilator set.          Arianna Is progressing well towards her goals.   Pt prognosis is Good.     Pt will continue to benefit from skilled outpatient physical therapy to address the deficits listed in the problem list box on initial evaluation, provide pt/family education and to maximize pt's level of independence in the home and community environment.     Pt's spiritual, cultural and educational needs considered and pt agreeable to plan of care and goals.     Anticipated barriers to physical therapy: none    Goals: Short Term Goals: 2 weeks  Pt to report increased awareness of PFM lift/drop during exercises and functional activities.  Pt to be I with double voiding techniques.  Pt to be I with diaphragmatic breathing.       Long Term Goals: 12 weeks   Pt will report improved ability to perform ADLs (ie. dressing, bathing, functional transfers) with little (drops) to no urinary leakage 7/7 days per week.  Pt will report improved ability to cough/sneeze/laugh/yell with little (drops) to no urinary leakage 7/7 days per week.   Pt will report successfully having intercourse with < or = 2/10 pain for an improvement  in activity tolerance.   Pt/family will be independent with HEP for continued self-management of symptoms.   ALL PROGRESSING    Plan     Plan of care Certification: 9/19/2024 to 12/19/2024.     Outpatient Physical Therapy 1 times per 2 week(s)  for 12 weeks to include the following interventions: therapeutic exercises, therapeutic activity, neuromuscular re-education, manual therapy, modalities PRN, patient/family education, and self care/home management    Meredith Torres, PT, BCB-PMD

## 2024-11-12 NOTE — PATIENT INSTRUCTIONS
Home Exercise Program: 11/12/2024    PERINEAL STRETCH / MASSAGE  1. Wash hands thoroughly with antibacterial soap.  2. Lay down comfortably with your back and head well supported by several pillows.  3. Apply water-based lubricant (ie. Slippery stuff, coconut oil, olive oil) on your thumbs and perineum.  4. Place one or both thumbs just inside the vagina.  5. Gently press downward, then slowly continue the stretch up both sides of the vaginal opening, holding for 10 seconds at each spot.   6. The amount of pressure for the stretch may cause discomfort, but not pain (ie. You can replicate the stretching sensation by bending your finger backward). Don't go above 4-5/10 pain during or after the exericse.  7. Focus on relaxed breathing and keeping the pelvic floor muscles dropped.   8. Continue for 5-10 minutes, 3-4 times per week.    STOP if there is increased bleeding, an infection, or extreme pain.         Some may prefer their partner to assist them or to perform the massage for them. Your partner needs to use clean hands and gently insert one or two index fingers inside the lower part of the vagina and follow the steps listed above. It is important to tell your partner how much pressure to apply without causing pain.      Releveum by [employer] [place] (intercourse only, or after stretching)

## 2024-11-18 ENCOUNTER — PATIENT MESSAGE (OUTPATIENT)
Dept: OBSTETRICS AND GYNECOLOGY | Facility: CLINIC | Age: 39
End: 2024-11-18
Payer: COMMERCIAL

## 2024-11-19 RX ORDER — LABETALOL 200 MG/1
200 TABLET, FILM COATED ORAL 2 TIMES DAILY
Qty: 60 TABLET | Refills: 1 | Status: SHIPPED | OUTPATIENT
Start: 2024-11-19 | End: 2025-11-19

## 2024-12-04 ENCOUNTER — CLINICAL SUPPORT (OUTPATIENT)
Dept: REHABILITATION | Facility: HOSPITAL | Age: 39
End: 2024-12-04
Payer: COMMERCIAL

## 2024-12-04 DIAGNOSIS — M62.9 DISORDER OF MUSCLE: Primary | ICD-10-CM

## 2024-12-04 PROCEDURE — 97140 MANUAL THERAPY 1/> REGIONS: CPT | Mod: PO

## 2024-12-04 NOTE — PROGRESS NOTES
Pelvic Health Physical Therapy   Treatment Note     Name: Arianna Kraft  Clinic Number: [id number]    Therapy Diagnosis:   Encounter Diagnosis   Name Primary?    Disorder of muscle Yes     Physician: Ronit Santo MD    Visit Date: 12/4/2024     Physician Orders: PT Eval and Treat    Medical Diagnosis from Referral: History of hip fracture [Z87.81]   Evaluation Date: 9/19/2024  Authorization Period Expiration: 12/31/2024  Plan of Care Expiration: 12/19/2024  Visit # / Visits authorized: 5/12  Cancelled Visits: 0  No Show Visits: 0    Time In: 11:20  Time Out: 12:00  Total Billable Time:  40 minutes    Precautions: Standard    Subjective     Pt reports: no new c/o.  Hasn't been sexually active since last session so cannot note improvement in symptoms yet.       She was compliant with home exercise program.  Response to previous treatment: no adverse effects  Functional change: less leakage with intercourse but sensitive area still there.        Pain: 0/10    Objective     Pt rec'd 40 minutes of manual therapy to promote tissue extensibility including passive stretching to the introital tissues.  mild hypertonicity was noted, R>L.   Most discomfort came from movement of the tissues at the posterior fourchette.  There was less muscle tightness noted today compared with last session.  I showed her the samples of the Intimate [employer]per wand and the Dilator set, and we chose the wand- she will order from [employer] and will bring it in next time.        Home Exercises Provided and Patient Education Provided     Education provided:   - posture/body mechanices  Discussed progression of plan of care with patient; educated pt in activity modification; reviewed HEP with pt. Pt demonstrated and verbalized understanding of all instruction and was provided with a handout of HEP (see Patient Instructions).    Written Home Exercises Provided: yes.  Exercises were reviewed and Arianna was able to demonstrate them prior to the end of  the session.  Arianna demonstrated good  understanding of the education provided.     See EMR under Patient Instructions for exercises provided prior session    Assessment     Once she is able to stretch and release her own muscles- she should be ready for discharge.  Leakage symptoms much improved.           Arianna Is progressing well towards her goals.   Pt prognosis is Good.     Pt will continue to benefit from skilled outpatient physical therapy to address the deficits listed in the problem list box on initial evaluation, provide pt/family education and to maximize pt's level of independence in the home and community environment.     Pt's spiritual, cultural and educational needs considered and pt agreeable to plan of care and goals.     Anticipated barriers to physical therapy: none    Goals: Short Term Goals: 2 weeks  Pt to report increased awareness of PFM lift/drop during exercises and functional activities.  Pt to be I with double voiding techniques.  Pt to be I with diaphragmatic breathing.       Long Term Goals: 12 weeks   Pt will report improved ability to perform ADLs (ie. dressing, bathing, functional transfers) with little (drops) to no urinary leakage 7/7 days per week.  Pt will report improved ability to cough/sneeze/laugh/yell with little (drops) to no urinary leakage 7/7 days per week.   Pt will report successfully having intercourse with < or = 2/10 pain for an improvement in activity tolerance.   Pt/family will be independent with HEP for continued self-management of symptoms.   ALL PROGRESSING    Plan     Plan of care Certification: 9/19/2024 to 12/19/2024.     Outpatient Physical Therapy 1 times per 2 week(s)  for 12 weeks to include the following interventions: therapeutic exercises, therapeutic activity, neuromuscular re-education, manual therapy, modalities PRN, patient/family education, and self care/home management    Meredith Torres, PT, BCB-PMD            English

## 2024-12-11 RX ORDER — LABETALOL 200 MG/1
200 TABLET, FILM COATED ORAL DAILY
Qty: 90 TABLET | Refills: 1 | Status: SHIPPED | OUTPATIENT
Start: 2024-12-11

## 2024-12-11 NOTE — TELEPHONE ENCOUNTER
"Refill Routing Note   Medication(s) are not appropriate for processing by [facility] for the following reason(s):        New or recently adjusted medication    [facility] action(s):  Defer        Medication Therapy Plan: New start under Dr. Abrams and verbal note to take " once daily" but Rx order was not changed.      Appointments  past 12m or future 3m with PCP    Date Provider   Last Visit   10/30/2024 Leti Abrams MD   Next Visit   1/8/2025 Leti Abrams MD   ED visits in past 90 days: 0        Note composed:2:22 PM 12/11/2024          "

## 2024-12-30 ENCOUNTER — CLINICAL SUPPORT (OUTPATIENT)
Dept: REHABILITATION | Facility: HOSPITAL | Age: 39
End: 2024-12-30
Payer: COMMERCIAL

## 2024-12-30 DIAGNOSIS — M62.9 DISORDER OF MUSCLE: Primary | ICD-10-CM

## 2024-12-30 PROCEDURE — 97140 MANUAL THERAPY 1/> REGIONS: CPT | Mod: PO

## 2024-12-30 NOTE — PROGRESS NOTES
Pelvic Health Physical Therapy   Plan of Care Reassessment     Name: Arianna Kraft  Clinic Number: [id number]    Therapy Diagnosis:   Encounter Diagnosis   Name Primary?    Disorder of muscle Yes     Physician: Ronit Santo MD    Visit Date: 12/30/2024     Physician Orders: PT Eval and Treat    Medical Diagnosis from Referral: History of hip fracture [Z87.81]   Evaluation Date: 9/19/2024  Authorization Period Expiration: 12/31/2024  Plan of Care Expiration: 12/19/2024  Visit # / Visits authorized: 6/12  Cancelled Visits: 0  No Show Visits: 0    Time In: 9:10  Time Out: 9:50  Total Billable Time:  40 minutes    Precautions: Standard    Subjective     Pt reports: no new c/o.  Brings in her vibrating wand today.  She was compliant with home exercise program.  Response to previous treatment: no adverse effects  Functional change: less leakage with intercourse but sensitive area still there.        Pain: 0/10    Objective     Pt rec'd 40 minutes of manual therapy to promote muscle lengthening and relaxation including passive stretching to the introital tissues and levator ani B'ly.  mild hypertonicity was noted, L>R.   This was accomplished first with PT exam finger, followed by pt after instruction with [employer] [place].  She was instructed in care and cleaning of wand.  With instruction, she was able to find her levators and apply pressure comfortably, with appropriate body mechanics.  Handout was provided.    Home Exercises Provided and Patient Education Provided     Education provided:   - posture/body mechanices  Discussed progression of plan of care with patient; educated pt in activity modification; reviewed HEP with pt. Pt demonstrated and verbalized understanding of all instruction and was provided with a handout of HEP (see Patient Instructions).    Written Home Exercises Provided: yes.  Exercises were reviewed and Arianna was able to demonstrate them prior to the end of the session.  Arianna  demonstrated good  understanding of the education provided.     See EMR under Patient Instructions for exercises provided prior session    Assessment     Once she is able to stretch and release her own muscles- she should be ready for discharge.  Leakage symptoms much improved.           Arianna Is progressing well towards her goals.   Pt prognosis is Good.     Pt will continue to benefit from skilled outpatient physical therapy to address the deficits listed in the problem list box on initial evaluation, provide pt/family education and to maximize pt's level of independence in the home and community environment.     Pt's spiritual, cultural and educational needs considered and pt agreeable to plan of care and goals.     Anticipated barriers to physical therapy: none    Goals: Short Term Goals: 2 weeks  Pt to report increased awareness of PFM lift/drop during exercises and functional activities.MET  Pt to be I with double voiding techniques.MET  Pt to be I with diaphragmatic breathing.  MET     Long Term Goals: 12 weeks   Pt will report improved ability to perform ADLs (ie. dressing, bathing, functional transfers) with little (drops) to no urinary leakage 7/7 days per week.MET  Pt will report improved ability to cough/sneeze/laugh/yell with little (drops) to no urinary leakage 7/7 days per week. MET  Pt will report successfully having intercourse with < or = 2/10 pain for an improvement in activity tolerance. PROGRESSING  Pt/family will be independent with HEP for continued self-management of symptoms. PROGRESSING    Plan     Plan of care Certification: 12/30/2024 to 3/30/2025.     Outpatient Physical Therapy 1 times per 2 week(s)  for 12 weeks to include the following interventions: therapeutic exercises, therapeutic activity, neuromuscular re-education, manual therapy, modalities PRN, patient/family education, and self care/home management    Meredith Torres, PT, BCB-PMD

## 2024-12-30 NOTE — PATIENT INSTRUCTIONS
"Home Exercise Program: 12/30/2024     MUSCLE TENSION RELEASE ("DROP") WITH THERAWAND  1. Make sure the wand is clean, free of any visible soiling.  2. Apply water-based lubricant to the wand and the vaginal opening.   3. Either sit on the toilet and lean back on the tank OR lay back with your back well supported by several pillows and both knees bent.   4. Spread labia and insert the wand.  5. Place the wand on the levator ani group of muscles on one side.                          To find the levator ani muscles:                        - In the middle at 6 o'clock = Rectum                        - Move a little to the right at 5 o'clock = Right levator ani group                        - Move a little to the left at 7 o'clock = Left levator ani group                        - If you feel sharp, stabbing pain = bone or pudendal nerve (wrong)  6. You can do a kegel (squeeze the pelvic floor muscles like you're trying not to pee or pass gas), only holding for 1-2 seconds.  7. Release and DROP the pelvic floor muscles, using the wand to apply light to medium pressure onto the levator ani muscle, encouraging it to let go.   8. Repeat 5 times on each side.  * Focus on building your control over the muscles. The goal is that you will be able to DROP the pelvic floor muscles on your own without the pressure from the wand.   9. Once finished, remove wand.  10. Wash with anti-bacterial soap and thoroughly rinse. Let air dry whenever possible. Store in a dry, clean location.   11. Repeat every other day.     *Do not apply so much pressure that you cannot tolerate it, your muscles start tightening up, or it leaves you too painful to do again the following day.     STOP if there is increased bleeding, an infection, or extreme pain.    "

## 2025-01-08 ENCOUNTER — OFFICE VISIT (OUTPATIENT)
Dept: OBSTETRICS AND GYNECOLOGY | Facility: CLINIC | Age: 40
End: 2025-01-08
Payer: COMMERCIAL

## 2025-01-08 VITALS
WEIGHT: 194.13 LBS | SYSTOLIC BLOOD PRESSURE: 142 MMHG | DIASTOLIC BLOOD PRESSURE: 89 MMHG | BODY MASS INDEX: 30.47 KG/M2 | HEIGHT: 67 IN

## 2025-01-08 DIAGNOSIS — I10 CHRONIC HYPERTENSION: ICD-10-CM

## 2025-01-08 DIAGNOSIS — F41.9 ANXIETY: ICD-10-CM

## 2025-01-08 DIAGNOSIS — Z30.09 ENCOUNTER FOR OTHER GENERAL COUNSELING AND ADVICE ON CONTRACEPTION: ICD-10-CM

## 2025-01-08 DIAGNOSIS — Z01.419 ENCOUNTER FOR ANNUAL ROUTINE GYNECOLOGICAL EXAMINATION: Primary | ICD-10-CM

## 2025-01-08 PROCEDURE — 99999 PR PBB SHADOW E&M-EST. PATIENT-LVL III: CPT | Mod: PBBFAC,,, | Performed by: OBSTETRICS & GYNECOLOGY

## 2025-01-08 RX ORDER — LABETALOL 200 MG/1
400 TABLET, FILM COATED ORAL DAILY
Qty: 180 TABLET | Refills: 1 | Status: SHIPPED | OUTPATIENT
Start: 2025-01-08

## 2025-01-08 RX ORDER — ESCITALOPRAM OXALATE 20 MG/1
20 TABLET ORAL DAILY
Qty: 90 TABLET | Refills: 3 | Status: SHIPPED | OUTPATIENT
Start: 2025-01-08 | End: 2026-01-08

## 2025-01-08 RX ORDER — DROSPIRENONE AND ETHINYL ESTRADIOL 0.02-3(28)
1 KIT ORAL DAILY
Qty: 84 TABLET | Refills: 3 | Status: SHIPPED | OUTPATIENT
Start: 2025-01-08

## 2025-01-08 NOTE — PROGRESS NOTES
"Chief Complaint: Well Woman Exam     HPI:      Arianna Kraft is a 39 y.o.  who presents today for well woman exam.  LMP: Patient's last menstrual period was 2024 (exact date).  No issues, problems, or complaints. Specifically, patient denies abnormal vaginal bleeding, discharge, pelvic pain, urinary problems, or changes in appetite. Pt has been on 200 mg Labetalol daily since delivery (came off for a brief time period, but Bps sumanth again). Today BP remains slightly elevated despite being on meds. She has also noticed that anxiety, which was very well controlled initially after starting 10 mg Lexapro, is now increasing. Thinks it is situational due to work stresses, but is affecting her sleep. Ms. Kraft is currently sexually active with a single male partner. She is currently using oral contraceptives (estrogen/progesterone) for contraception. She declines STD screening today.    Previous Pap: NILM, HPV negative (2023)  T-C Score: 14.1% ()     Gardasil:Completed     Ms. Kraft confirms that she wears her seatbelt when riding in the car and does not text while driving.     OB History          1    Para   1    Term   1       0    AB   0    Living   1         SAB   0    IAB   0    Ectopic   0    Multiple   0    Live Births   1           Obstetric Comments   Abnormal pap with HPV and negative since              Physical Exam:      Physical Exam     BP (!) 142/89 (Patient Position: Sitting)   Ht 5' 7" (1.702 m)   Wt 88 kg (194 lb 1.8 oz)   LMP 2024 (Exact Date)   Breastfeeding No   BMI 30.40 kg/m²   Body mass index is 30.4 kg/m².     APPEARANCE: Well nourished, well developed, in no acute distress.  PSYCH: Appropriate mood and affect.  SKIN: No acne or hirsutism  NECK: Neck symmetric without masses  NODES: No inguinal, axillary, or supraclavicular lymph node enlargement  ABDOMEN: Soft.  No tenderness or masses.    CARDIOVASCULAR: No edema of peripheral " extremities  BREASTS: Symmetrical, no visible skin lesions. No palpable masses. No nipple discharge bilaterally.  PELVIC: Normal external genitalia without lesions.  Normal hair distribution.  Adequate perineal body, normal urethral meatus.  Vagina moist and well rugated. Without lesions. Vagina without abnormal discharge.  Cervix without lesions, abnormal discharge, or tenderness.. No significant cystocele or rectocele.  Bimanual exam shows uterus to be normal size, regular, mobile and nontender.  Adnexa without masses or tenderness.      A female chaperone was present for the breast and pelvic exam.     Assessment/Plan:     Encounter for annual routine gynecological examination    Chronic hypertension  -     Ambulatory referral/consult to Internal Medicine; Future; Expected date: 01/15/2025  -     labetaloL (NORMODYNE) 200 MG tablet; Take 2 tablets (400 mg total) by mouth once daily.  Dispense: 180 tablet; Refill: 1    Anxiety  -     EScitalopram oxalate (LEXAPRO) 20 MG tablet; Take 1 tablet (20 mg total) by mouth once daily.  Dispense: 90 tablet; Refill: 3    Encounter for other general counseling and advice on contraception  -     drospirenone-ethinyl estradioL (KELSIE) 3-0.02 mg per tablet; Take 1 tablet by mouth once daily.  Dispense: 84 tablet; Refill: 3        Follow up in about 1 year (around 1/8/2026) for Annual Exam.    Counseling:     Patient was counseled today on current ASCCP pap guidelines, the recommendation for yearly physical exams, safe driving habits, and breast self awareness. She is to see her PCP for other health maintenance.     Use of the [facility] Patient Portal discussed and encouraged during today's visit.

## 2025-01-10 ENCOUNTER — LAB VISIT (OUTPATIENT)
Dept: LAB | Facility: OTHER | Age: 40
End: 2025-01-10
Attending: INTERNAL MEDICINE
Payer: COMMERCIAL

## 2025-01-10 ENCOUNTER — OFFICE VISIT (OUTPATIENT)
Dept: INTERNAL MEDICINE | Facility: CLINIC | Age: 40
End: 2025-01-10
Payer: COMMERCIAL

## 2025-01-10 VITALS
WEIGHT: 193.31 LBS | DIASTOLIC BLOOD PRESSURE: 82 MMHG | BODY MASS INDEX: 30.34 KG/M2 | HEIGHT: 67 IN | SYSTOLIC BLOOD PRESSURE: 116 MMHG | OXYGEN SATURATION: 99 % | HEART RATE: 86 BPM

## 2025-01-10 DIAGNOSIS — F41.9 ANXIETY: ICD-10-CM

## 2025-01-10 DIAGNOSIS — I10 CHRONIC HYPERTENSION: ICD-10-CM

## 2025-01-10 DIAGNOSIS — E66.811 OBESITY (BMI 30.0-34.9): ICD-10-CM

## 2025-01-10 DIAGNOSIS — Z00.01 ENCOUNTER FOR PREVENTATIVE ADULT HEALTH CARE EXAM WITH ABNORMAL FINDINGS: ICD-10-CM

## 2025-01-10 DIAGNOSIS — I10 UNCONTROLLED HYPERTENSION: Primary | ICD-10-CM

## 2025-01-10 LAB
ALBUMIN SERPL BCP-MCNC: 3.9 G/DL (ref 3.5–5.2)
ALP SERPL-CCNC: 85 U/L (ref 40–150)
ALT SERPL W/O P-5'-P-CCNC: 17 U/L (ref 10–44)
ANION GAP SERPL CALC-SCNC: 13 MMOL/L (ref 8–16)
AST SERPL-CCNC: 14 U/L (ref 10–40)
BASOPHILS # BLD AUTO: 0.05 K/UL (ref 0–0.2)
BASOPHILS NFR BLD: 0.6 % (ref 0–1.9)
BILIRUB SERPL-MCNC: 0.3 MG/DL (ref 0.1–1)
BUN SERPL-MCNC: 17 MG/DL (ref 6–20)
CALCIUM SERPL-MCNC: 9.6 MG/DL (ref 8.7–10.5)
CHLORIDE SERPL-SCNC: 105 MMOL/L (ref 95–110)
CHOLEST SERPL-MCNC: 180 MG/DL (ref 120–199)
CHOLEST/HDLC SERPL: 2.7 {RATIO} (ref 2–5)
CO2 SERPL-SCNC: 19 MMOL/L (ref 23–29)
CREAT SERPL-MCNC: 0.9 MG/DL (ref 0.5–1.4)
DIFFERENTIAL METHOD BLD: NORMAL
EOSINOPHIL # BLD AUTO: 0.1 K/UL (ref 0–0.5)
EOSINOPHIL NFR BLD: 1.4 % (ref 0–8)
ERYTHROCYTE [DISTWIDTH] IN BLOOD BY AUTOMATED COUNT: 12.5 % (ref 11.5–14.5)
EST. GFR  (NO RACE VARIABLE): >60 ML/MIN/1.73 M^2
ESTIMATED AVG GLUCOSE: 100 MG/DL (ref 68–131)
GLUCOSE SERPL-MCNC: 93 MG/DL (ref 70–110)
HBA1C MFR BLD: 5.1 % (ref 4–5.6)
HCT VFR BLD AUTO: 40.6 % (ref 37–48.5)
HDLC SERPL-MCNC: 66 MG/DL (ref 40–75)
HDLC SERPL: 36.7 % (ref 20–50)
HGB BLD-MCNC: 13.4 G/DL (ref 12–16)
IMM GRANULOCYTES # BLD AUTO: 0.03 K/UL (ref 0–0.04)
IMM GRANULOCYTES NFR BLD AUTO: 0.4 % (ref 0–0.5)
LDLC SERPL CALC-MCNC: 91 MG/DL (ref 63–159)
LYMPHOCYTES # BLD AUTO: 2.1 K/UL (ref 1–4.8)
LYMPHOCYTES NFR BLD: 26.1 % (ref 18–48)
MCH RBC QN AUTO: 28.9 PG (ref 27–31)
MCHC RBC AUTO-ENTMCNC: 33 G/DL (ref 32–36)
MCV RBC AUTO: 88 FL (ref 82–98)
MONOCYTES # BLD AUTO: 0.5 K/UL (ref 0.3–1)
MONOCYTES NFR BLD: 6.8 % (ref 4–15)
NEUTROPHILS # BLD AUTO: 5.1 K/UL (ref 1.8–7.7)
NEUTROPHILS NFR BLD: 64.7 % (ref 38–73)
NONHDLC SERPL-MCNC: 114 MG/DL
NRBC BLD-RTO: 0 /100 WBC
PLATELET # BLD AUTO: 347 K/UL (ref 150–450)
PMV BLD AUTO: 9.9 FL (ref 9.2–12.9)
POTASSIUM SERPL-SCNC: 3.9 MMOL/L (ref 3.5–5.1)
PROT SERPL-MCNC: 7.9 G/DL (ref 6–8.4)
RBC # BLD AUTO: 4.63 M/UL (ref 4–5.4)
SODIUM SERPL-SCNC: 137 MMOL/L (ref 136–145)
TRIGL SERPL-MCNC: 115 MG/DL (ref 30–150)
WBC # BLD AUTO: 7.85 K/UL (ref 3.9–12.7)

## 2025-01-10 PROCEDURE — 80061 LIPID PANEL: CPT | Performed by: INTERNAL MEDICINE

## 2025-01-10 PROCEDURE — 80053 COMPREHEN METABOLIC PANEL: CPT | Performed by: INTERNAL MEDICINE

## 2025-01-10 PROCEDURE — 85025 COMPLETE CBC W/AUTO DIFF WBC: CPT | Performed by: INTERNAL MEDICINE

## 2025-01-10 PROCEDURE — 99999 PR PBB SHADOW E&M-EST. PATIENT-LVL III: CPT | Mod: PBBFAC,,, | Performed by: INTERNAL MEDICINE

## 2025-01-10 PROCEDURE — 83036 HEMOGLOBIN GLYCOSYLATED A1C: CPT | Performed by: INTERNAL MEDICINE

## 2025-01-10 PROCEDURE — 36415 COLL VENOUS BLD VENIPUNCTURE: CPT | Performed by: INTERNAL MEDICINE

## 2025-01-10 NOTE — PROGRESS NOTES
Subjective:      Patient ID: Arianna Kraft is a 39 y.o. female.    Chief Complaint: Hypertension      History of Present Illness    CHIEF COMPLAINT:    The patient is a 39-year-old woman presenting today for elevated blood pressure following her pregnancy. She is seeking to establish care at our clinic as a new primary care patient.  Medical History:  The patient is a full-time  and . She developed anxiety after a normal delivery and is currently taking Lexapro 20 mg daily, with no reports of depression, suicidal ideation, insomnia, or palpitations.  Hypertension:  The patients blood pressure has remained elevated after her pregnancy. Her home blood pressure log reports an average of 135/85. Pre-pregnancy, her typical blood pressure was 110/65. She has been taking labetalol 200 mg daily, which was recently increased to 400 mg daily by her gynecologist last week. Today, her blood pressure is 116/82 with a pulse of 86.  Weight and Lifestyle:  The patient gained significant weight during her pregnancy and currently has a BMI of 30.3, indicating obesity. She denies smoking, alcohol consumption, and illicit drug use.  Vaccinations:  She has not received the flu vaccine or a COVID-19 booster this year but plans to receive both vaccinations today.  Allergies:  The patient has no known medication allergies but has an egg allergy.  Family History:  There is no family history of hypertension, diabetes, hyperlipidemia, coronary artery disease, or stroke. Her father has a history of ALS, and her sister has Crohns disease. There is no family history of breast, lung, or colon cancer.  Review of Records:  The patient is new to our clinic. I have reviewed her previous medical records, laboratory results, abdominal ultrasound reports, and current medications. This review took an additional 10 minutes.        HYPERTENSION:  She experienced blood pressure elevation at the end of pregnancy leading to  early delivery. Her blood pressure has not returned to pre-pregnancy levels in the six months since delivery. Pre-pregnancy blood pressure was 100-110/60-70, with current readings fluctuating up to 145/87. She discontinued Labetalol which resulted in increased blood pressure. She reports occasional headaches and blurred vision.    MENTAL HEALTH:  She continues Lexapro for depression and post-traumatic anxiety since October, with a recent dosage increase by her OB.    MEDICATIONS:  She is currently on birth control.    FAMILY HISTORY:  Her grandmother had glaucoma, great aunt had breast cancer, father had ALS, and sister has Crohn's disease and colitis. No family history of hypertension, diabetes, or hyperlipidemia.    SOCIAL HISTORY:  She quit smoking after marriage and denies current tobacco use, marijuana, or other illicit drugs. She reports occasional alcohol use, typically one glass of wine when dining out. She maintains daily two-mile walks and attends gym once to twice weekly.    PREVENTIVE CARE:  She is overdue for flu vaccine. Last COVID booster was in November of previous year.      ROS:  General: -fever, -chills, -fatigue, -weight gain, -weight loss  Eyes: +vision changes, -redness, -discharge  ENT: -ear pain, -nasal congestion, -sore throat  Cardiovascular: -chest pain, -palpitations, -lower extremity edema  Respiratory: -cough, -shortness of breath  Gastrointestinal: -abdominal pain, -nausea, -vomiting, -diarrhea, -constipation, -blood in stool  Genitourinary: -dysuria, -hematuria, -frequency  Musculoskeletal: -joint pain, -muscle pain  Skin: -rash, -lesion  Neurological: +headache, -dizziness, -numbness, -tingling  Psychiatric: -anxiety, -depression, -sleep difficulty         Active Problem List with Overview Notes    Diagnosis Date Noted    Disorder of muscle 09/19/2024    Breast feeding status of mother 07/21/2024    Anemia due to blood loss, acute 07/21/2024 7/21/2024 PPD #1  39year old G1 now   s/p  w/ GHTN. Second degree laceration. QBL 600cc. Doing well, ambulating, voiding, and tolerating regular diet. Mild PP anemia, asymptomatic. VSS w/ several elevated, 2 severe range BPs during hospital course. Plan D/C tomorrow.       Gestational hypertension 2024 PPD #1  39year old G1 now  s/p  w/ GHTN. Second degree laceration. QBL 600cc. Doing well, ambulating, voiding, and tolerating regular diet.Mild PP anemia, asymptomatic. VSS w/ several elevated, 2 severe range BPs during hospital course. Denies headache, visual, epigastric discomfort.   Lochia: steadily decreasing. VSS, afebrile. Plan D/C tomorrow.       Multigravida of advanced maternal age in third trimester 2024    Pregnancy related hip pain in third trimester, antepartum 2024    Decreased strength of lower extremity 2024        MEDICATIONS:    Current Outpatient Medications:     drospirenone-ethinyl estradioL (KELSIE) 3-0.02 mg per tablet, Take 1 tablet by mouth once daily., Disp: 84 tablet, Rfl: 3    EScitalopram oxalate (LEXAPRO) 20 MG tablet, Take 1 tablet (20 mg total) by mouth once daily., Disp: 90 tablet, Rfl: 3    labetaloL (NORMODYNE) 200 MG tablet, Take 2 tablets (400 mg total) by mouth once daily., Disp: 180 tablet, Rfl: 1  No current facility-administered medications for this visit.    ALLERGIES:  Review of patient's allergies indicates:  No Known Allergies     Past Medical History:   Diagnosis Date    Abnormal Pap smear of cervix      History reviewed. No pertinent surgical history.  Social History     Social History Narrative    Not on file     Family History   Problem Relation Name Age of Onset    Breast cancer Paternal Aunt      Colon cancer Neg Hx      Cancer Neg Hx      Diabetes Neg Hx      Eclampsia Neg Hx      Hypertension Neg Hx      Miscarriages / Stillbirths Neg Hx      Ovarian cancer Neg Hx       labor Neg Hx      Stroke Neg Hx         Vitals:    01/10/25 1125   BP:  "116/82   Pulse: 86   SpO2: 99%   Weight: 87.7 kg (193 lb 5.5 oz)   Height: 5' 7" (1.702 m)   PainSc: 0-No pain       Review of Systems     No results found for: "HGBA1C"  No results found for: "MICALBCREAT"  No results found for: "LDLCALC", "CHOL", "HDL", "TRIG"    Lab Results   Component Value Date     08/03/2024    K 4.1 08/03/2024     08/03/2024    CO2 21 (L) 08/03/2024    GLU 92 08/03/2024    BUN 14 08/03/2024    CREATININE 0.8 08/03/2024    CALCIUM 9.2 08/03/2024    PROT 7.2 08/03/2024    ALBUMIN 3.5 08/03/2024    BILITOT 0.3 08/03/2024    ALKPHOS 104 08/03/2024    AST 23 08/03/2024    ALT 37 08/03/2024    ANIONGAP 9 08/03/2024    WBC 13.32 (H) 08/03/2024    HGB 13.3 08/03/2024    HGB 12.6 08/02/2024    HCT 40.7 08/03/2024    MCV 89 08/03/2024     08/03/2024    HEPCAB Non-reactive 12/01/2023       No results found for: "LH", "FSH", "TOTALTESTOST", "PROGESTERONE", "ESTRADIOL", "[id number]", "[id number]", "FERRITIN", "IRON", "TRANSFERRIN", "TIBC", "FESATURATED", "ZINC"    Objective:   Physical Exam   Physical Exam    Vitals: Blood pressure: 145/87. Pulse rate: 86.  General: No acute distress. Well-developed. Well-nourished.  Eyes: EOMI. Sclerae anicteric.  HENT: Normocephalic. Atraumatic. Nares patent. Moist oral mucosa.  Ears: Bilateral TMs clear. Bilateral EACs clear.  Cardiovascular: Regular rate. Regular rhythm. No murmurs. No rubs. No gallops. Normal S1, S2.  Respiratory: Normal respiratory effort. Clear to auscultation bilaterally. No rales. No rhonchi. No wheezing.  Abdomen: Soft. Non-tender. Non-distended. Normoactive bowel sounds.  Musculoskeletal: No  obvious deformity.  Extremities: No lower extremity edema.  Neurological: Alert & oriented x3. No slurred speech. Normal gait.  Psychiatric: Normal mood. Normal affect. Good insight. Good judgment.  Skin: Warm. Dry. No rash.         Assessment:     1. Uncontrolled hypertension    2. Chronic hypertension    3. Obesity (BMI 30.0-34.9)  "   4. Encounter for preventative adult health care exam with abnormal findings    5. Anxiety      Plan:   Assessment & Plan   Uncontrolled hypertension  -     EKG 12-lead; Future    Chronic hypertension  -     Ambulatory referral/consult to Internal Medicine  -     EKG 12-lead; Future    Obesity (BMI 30.0-34.9)  -     Hemoglobin A1C; Future; Expected date: 01/10/2025  -     Lipid Panel; Future; Expected date: 01/10/2025    Encounter for preventative adult health care exam with abnormal findings  -     CBC Auto Differential; Future; Expected date: 01/10/2025  -     Comprehensive Metabolic Panel; Future; Expected date: 01/10/2025  -     Hemoglobin A1C; Future; Expected date: 01/10/2025  -     Lipid Panel; Future; Expected date: 01/10/2025  -     influenza (Flulaval, Fluzone, Fluarix) 45 mcg/0.5 mL IM vaccine (> or = 6 mo) 0.5 mL  -     COVID-19 (Pfizer) 30 mcg/0.3 mL IM vaccine (>/= 13 yo) 0.3 mL    Anxiety       Assessment & Plan    IMPRESSION:  - Assessed postpartum hypertension, noting fluctuations and recent elevation despite intermittent Labetalol use  - Considered impact of hormonal changes, water retention, and stress on blood pressure  - Evaluated for concerning symptoms like headaches, blurred vision, and fatigue  - Reviewed family history for relevant conditions  - Determined current blood pressure not critically elevated, but warrants continued monitoring and management  - Will continue Labetalol at increased dose due to its safety profile with recent breastfeeding    HYPERTENSION:  - Monitored patient's blood pressure, which has not consistently decreased since delivery almost 6 months ago.  - Recent home readings show a maximum of 145/87.  - Evaluated current blood pressure, noting it is not critically high.  - Explained that hormonal changes, water retention, and stress can contribute to blood pressure fluctuations postpartum.  - Increased Labetalol dosage to 200 mg twice daily (400 mg total).  - Advised  patient to continue taking medication and reduce sodium intake.  - Recommend regular exercise, such as daily 2-mile walks.  - Instructed patient to monitor for medication side effects, particularly fatigue or low pulse rate.  - Advised patient to create a blood pressure logbook, recording measurements daily.  - Ordered regular labs and EKG.  - Scheduled follow up in 4 weeks to ensure blood pressure is controlled and review test results.  - Discussed how stress and lifestyle changes can impact blood pressure.  - Patient to reduce salt intake to help balance water retention and lower blood pressure.  - Patient to continue daily 2-mile walks.  - Ordered blood test to check white blood cell count, kidney function, liver function, and blood sugar.  - Ordered EKG to establish baseline heart condition.    DEPRESSION AND POST-TRAUMATIC ANXIETY:  - Continued Lexapro at recently increased dosage for management of depression and post-traumatic anxiety.  - Noted patient's report that Lexapro has been helpful in managing anxiety.  - Acknowledged that mood changes and anxiety can be related to hormonal imbalances postpartum.    VISUAL DISTURBANCES:  - Noted patient's report of occasional visual disturbances, described as 'a little dot or something'.    SMOKING HISTORY:  - Documented patient's history of smoking in college, with subsequent cessation.    INFLUENZA VACCINATION:  - Emphasized the importance of influenza and COVID-19 vaccinations for protecting self and family.  - Administered influenza vaccine during the visit.    COVID-19 VACCINATION:  - Emphasized the importance of influenza and COVID-19 vaccinations for protecting self and family.  - Noted patient's last COVID-19 vaccination was in November of the previous year.  - Administered COVID-19 booster shot during the visit.    HYPERLIPIDEMIA:  - Ordered blood test to check cholesterol levels as part of regular health screening.    POSTPARTUM CARE:  - Documented that  "patient delivered a baby on July 20th, approximately 6 months ago.    FOLLOW UP:  - Will send a message through patient portal with test results, either confirming they are okay or if there are concerns.         Orders Placed This Encounter    CBC Auto Differential    Comprehensive Metabolic Panel    Hemoglobin A1C    Lipid Panel    EKG 12-lead    influenza (Flulaval, Fluzone, Fluarix) 45 mcg/0.5 mL IM vaccine (> or = 6 mo) 0.5 mL    COVID-19 (Pfizer) 30 mcg/0.3 mL IM vaccine (>/= 13 yo) 0.3 mL       Follow up in about 4 weeks (around 2/7/2025).     There arePatient Instructions on file for this visit.  [unfilled]   All of your core healthy metrics are met.  High Blood Pressure in Adults   The Basics   Written by the doctors and editors at [facility]   What is high blood pressure? -- High blood pressure is a condition that puts you at risk for heart attack, stroke, and kidney disease. It does not usually cause symptoms. But it can be serious.  When your doctor or nurse tells you your blood pressure, they will say 2 numbers. For instance, your doctor or nurse might say that your blood pressure is "130 over 80." The top number is the pressure inside your arteries when your heart is mikayla. The bottom number is the pressure inside your arteries when your heart is relaxed.  "Elevated blood pressure" is a term doctors or nurses use as a warning. People with elevated blood pressure do not yet have high blood pressure. But their blood pressure is not as low as it should be for good health.  Many experts define high, elevated, and normal blood pressure as follows:  High - Top number of 130 or above and/or bottom number of 80 or above  Elevated - Top number between 120 and 129 and bottom number of 79 or below  Normal - Top number of 119 or below and bottom number of 79 or below  This information is also in the table (table 1).   How can I lower my blood pressure? -- If your doctor or nurse has prescribed blood pressure " "medicine, the most important thing you can do is to take it. If it causes side effects, do not just stop taking it. Instead, talk to your doctor or nurse about the problems it causes. They might be able to lower your dose or switch you to another medicine. If cost is a problem, mention that too. They might be able to put you on a less expensive medicine. Taking your blood pressure medicine can keep you from having a heart attack or stroke, and it can save your life!  Can I do anything on my own? -- You have a lot of control over your blood pressure. To lower it:  Lose weight (if you are overweight)  Choose a diet low in fat and rich in fruits, vegetables, and low-fat dairy products  Reduce the amount of salt you eat  Do something active for at least 30 minutes a day on most days of the week  Cut down on alcohol (if you drink more than 2 alcoholic drinks per day)  It's also a good idea to get a home blood pressure meter. People who check their own blood pressure at home do better at keeping it low and can sometimes even reduce the amount of medicine they take.  All topics are updated as new evidence becomes available and our peer review process is complete.  This topic retrieved from [employer] on: Sep 21, 2021.  Topic 44594 Version 15.0  Release: 29.4.2 - C29.263  © 2021 UpToDate, Inc. and/or its affiliates. All rights reserved.  table 1: Definition of normal and high blood pressure  Level  Top number  Bottom number    High 130 or above 80 or above   Elevated 120 to 129 79 or below   Normal 119 or below 79 or below   These definitions are from the American College of Cardiology/American Heart Association. Other expert groups might use slightly different definitions.  "Elevated blood pressure" is a term doctor or nurses use as a warning. It means you do not yet have high blood pressure, but your blood pressure is not as low as it should be for good health.  Graphic 99012 Version 6.0  Consumer Information Use and " Disclaimer   This information is not specific medical advice and does not replace information you receive from your health care provider. This is only a brief summary of general information. It does NOT include all information about conditions, illnesses, injuries, tests, procedures, treatments, therapies, discharge instructions or life-style choices that may apply to you. You must talk with your health care provider for complete information about your health and treatment options. This information should not be used to decide whether or not to accept your health care provider's advice, instructions or recommendations. Only your health care provider has the knowledge and training to provide advice that is right for you. The use of this information is governed by the [employer] End User License Agreement, available at https://www.[employer]/en/solutions/rankur/about/elda.The use of [employer] content is governed by the [employer] Terms of Use. ©2021 UpToDate, Inc. All rights reserved.  Copyright   © 2021 UpToDate, Inc. and/or its affiliates. All rights reserved.       Visit Checklist (as applicable):  1. Status of new and prior symptoms discussed? yes  2. Imaging reviewed/ ordered as appropriate? yes  3. Lab study reviewed/ ordered as appropriate? yes  4. Plan for work-up and treatment discussed with patient? yes  5. Potential medication side-effects and monitoring plan discussed? yes  6. Review of outside medical records was performed and pertinent details are summarized in the HPI above? yes     Time spent on this encounter: 30 minutes. This includes face to face time and non-face to face time preparing to see the patient (eg, review of tests), obtaining and/or reviewing separately obtained history, documenting clinical information in the electronic or other health record, independently interpreting results (not separately reported) and communicating results to the patient/family/caregiver, or care coordination  (not separately reported). Also patient education regarding chronic and acute medical conditions reviewed. Patient handouts given if appropriate.     Each patient to whom he or she provides medical services by telemedicine is:  (1) informed of the relationship between the physician and patient and the respective role of any other health care provider with respect to management of the patient; and (2) notified that he or she may decline to receive medical services by telemedicine and may withdraw from such care at any time.     This note was generated with the assistance of ambient listening technology. Verbal consent was obtained by the patient and accompanying visitor(s) for the recording of patient appointment to facilitate this note. I attest to having reviewed and edited the generated note for accuracy, though some syntax or spelling errors may persist. Please contact the author of this note for any clarification.       Andrae Thomas MD  [facility]

## 2025-01-10 NOTE — PROGRESS NOTES
After obtaining consent, and per orders of Dr. Leblanc , injection of Fluarix Lot [id number] 06/30/25  given in the Right Deltoid  by Sandy Carter, AVANI. Patient tolerated well and band aid applied. Patient instructed to remain in clinic for 15 minutes afterwards, and to report any adverse reaction to me immediately.  04/07/25  After obtaining consent, and per orders of Dr. Leblanc , injection oComirnaty/COVID  Lot [id number] Exp\04/07/25  given in the Left Deltoid by Sandy Carter RN. Patient tolerated well and band aid applied. Patient instructed to remain in clinic for 15 minutes afterwards, and to report any adverse reaction to me immediately.

## 2025-02-04 ENCOUNTER — CLINICAL SUPPORT (OUTPATIENT)
Dept: REHABILITATION | Facility: HOSPITAL | Age: 40
End: 2025-02-04
Payer: COMMERCIAL

## 2025-02-04 DIAGNOSIS — M62.9 DISORDER OF MUSCLE: Primary | ICD-10-CM

## 2025-02-04 PROCEDURE — 97140 MANUAL THERAPY 1/> REGIONS: CPT | Mod: PO

## 2025-02-04 PROCEDURE — 97530 THERAPEUTIC ACTIVITIES: CPT | Mod: PO

## 2025-02-04 NOTE — PROGRESS NOTES
Outpatient Rehab    Physical Therapy Discharge    Patient Name: Arianna Kraft  MRN: [id number]  YOB: 1985  Today's Date: 2/4/2025    Therapy Diagnosis:   Encounter Diagnosis   Name Primary?    Disorder of muscle Yes     Physician: Ronit Santo MD    Physician Orders: Eval and Treat  Medical Diagnosis: History of hip fracture [Z87.81]     Visit # / Visits Authorized:  7 / 10   Date of Evaluation: 9/19/2024  Insurance Authorization Period: 1/1/2025 to 12/31/2025  Plan of Care Certification:  12/30/2024 to 3/30/2024      Time In: 0220   Time Out: 0300  Total Time: 40   Total Billable Time: 40 minutes    Precautions     universal      Subjective pt reports that she is not using the wand as often as she would like- is however about to feel her muscles release when she does use it.      Pain     Patient reports a current pain level of 1/10.                 Objective            Intake Outcome Measure for FOTO Survey    Therapist reviewed FOTO scores for Arianna Kraft on 2/4/2025.   FOTO report - see Media section or FOTO account episode details.     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     Assessment & Plan   Assessment: Pt is having much less discomfort and is I with managing her remaining symptoms with stretching and with a wand.  Reporting no urine leakage.  Ready for discharge from PT.  Evaluation/Treatment Tolerance: Patient tolerated treatment well  Education  Education was done with Patient. The patient's learning style includes Listening  and Reading. The patient Verbalizes understanding.         Maintenance HEP       Plan: d/c PT    Goals: Long Term Goals: 12 weeks   Pt will report improved ability to perform ADLs (ie. dressing, bathing, functional transfers) with little (drops) to no urinary leakage 7/7 days per week.MET  Pt will report improved ability to cough/sneeze/laugh/yell with little (drops) to no urinary leakage 7/7 days per week. MET  Pt will report successfully having intercourse with < or = 2/10 pain for an improvement in activity tolerance. MET  Pt/family will be independent with HEP for continued self-management of symptoms. MET    Meredith Torres, PT, BCB-PMD

## 2025-02-04 NOTE — PATIENT INSTRUCTIONS
"HOME PROGRAM:  2/4/2025    Perineal stretching and/or dilator stretching to the pelvic floor muscles, especially on the L side.    TA sets  Frog leg stretch with belly breath  Piriformis stretch (knee to opposite shoulder)  Figure 4 stretch  Reverse Kegels (breathe in and relax first, then breathe out and squeeze as if stopping urine flow.    Walks are great!  Adjust your expectations about what your "workouts" will look like at this time in your life.    "

## 2025-02-14 ENCOUNTER — OFFICE VISIT (OUTPATIENT)
Dept: INTERNAL MEDICINE | Facility: CLINIC | Age: 40
End: 2025-02-14
Payer: COMMERCIAL

## 2025-02-14 VITALS
WEIGHT: 191.81 LBS | BODY MASS INDEX: 30.1 KG/M2 | HEIGHT: 67 IN | OXYGEN SATURATION: 96 % | DIASTOLIC BLOOD PRESSURE: 76 MMHG | HEART RATE: 79 BPM | SYSTOLIC BLOOD PRESSURE: 130 MMHG

## 2025-02-14 DIAGNOSIS — F41.9 ANXIETY: ICD-10-CM

## 2025-02-14 DIAGNOSIS — I10 HYPERTENSION, UNSPECIFIED TYPE: ICD-10-CM

## 2025-02-14 DIAGNOSIS — E66.811 OBESITY (BMI 30.0-34.9): ICD-10-CM

## 2025-02-14 DIAGNOSIS — R06.4 HYPERVENTILATION: Primary | ICD-10-CM

## 2025-02-14 NOTE — PROGRESS NOTES
Subjective:      Patient ID: Arianna Kraft is a 39 y.o. female.    Chief Complaint: Hypertension      History of Present Illness    CHIEF COMPLAINT:  Patient presents today for follow up for annual blood test results and assessment her hypertension treatment.  She tolerated labetalol for hypertension controlled well since last visit.  Blood pressure has been proved significantly with home blood pressure average 120 over 70 based on her blood pressure log book.  Today her blood pressure is 130/76 with pulse rate 79 per minutes.  Her laboratory study showing normal renal function, liver function, electrolytes except lower CO2 suggest and hyperventilation associated with her anxiety.  She has normal lipid profile normal WBC platelet.  She has previously anemia in his medical history which has been back to normal in hemoglobin hematocrit from recent CBC study.  Lab study results has been reviewed, interpreted by myself.  I have counseled the results with her today.  Regarding her anxieties and overweight have educated counseled her today.  Try to reduce the working stress and taking low-calorie diet to do daily exercise as tolerated for body weight loss.  I will be follow-up her in 1 year or as needed.      BLOOD PRESSURE:  She monitors blood pressure at home.    LABS:  Cholesterol panel showed total cholesterol of 180 mg/dL, triglycerides within normal range, HDL of 66 mg/dL, and LDL of 90 mg/dL. HbA1c values range from 4.5-6.0%. Laboratory studies indicate persistently low chloride levels, which may be associated with anxiety. She has a history of resolved anemia.         Active Problem List with Overview Notes    Diagnosis Date Noted    Disorder of muscle 2024    Breast feeding status of mother 2024    Anemia due to blood loss, acute 2024 PPD #1  39year old G1 now  s/p  w/ GHTN. Second degree laceration. QBL 600cc. Doing well, ambulating, voiding, and tolerating regular  "diet. Mild PP anemia, asymptomatic. VSS w/ several elevated, 2 severe range BPs during hospital course. Plan D/C tomorrow.       Gestational hypertension 2024 PPD #1  39year old G1 now  s/p  w/ GHTN. Second degree laceration. QBL 600cc. Doing well, ambulating, voiding, and tolerating regular diet.Mild PP anemia, asymptomatic. VSS w/ several elevated, 2 severe range BPs during hospital course. Denies headache, visual, epigastric discomfort.   Lochia: steadily decreasing. VSS, afebrile. Plan D/C tomorrow.       Multigravida of advanced maternal age in third trimester 2024    Pregnancy related hip pain in third trimester, antepartum 2024    Decreased strength of lower extremity 2024        MEDICATIONS:    Current Outpatient Medications:     drospirenone-ethinyl estradioL (KELSIE) 3-0.02 mg per tablet, Take 1 tablet by mouth once daily., Disp: 84 tablet, Rfl: 3    EScitalopram oxalate (LEXAPRO) 20 MG tablet, Take 1 tablet (20 mg total) by mouth once daily., Disp: 90 tablet, Rfl: 3    labetaloL (NORMODYNE) 200 MG tablet, Take 2 tablets (400 mg total) by mouth once daily., Disp: 180 tablet, Rfl: 1    ALLERGIES:  Review of patient's allergies indicates:  No Known Allergies     Past Medical History:   Diagnosis Date    Abnormal Pap smear of cervix      History reviewed. No pertinent surgical history.  Social History     Social History Narrative    Not on file     Family History   Problem Relation Name Age of Onset    Breast cancer Paternal Aunt      Colon cancer Neg Hx      Cancer Neg Hx      Diabetes Neg Hx      Eclampsia Neg Hx      Hypertension Neg Hx      Miscarriages / Stillbirths Neg Hx      Ovarian cancer Neg Hx       labor Neg Hx      Stroke Neg Hx         Vitals:    25 1502   BP: 130/76   Pulse: 79   SpO2: 96%   Weight: 87 kg (191 lb 12.8 oz)   Height: 5' 7" (1.702 m)       Review of Systems   Constitutional:  Negative for chills and fever.   HENT:  Negative " "for nasal congestion and sore throat.    Eyes:  Negative for visual disturbance.   Respiratory:  Negative for cough and shortness of breath.    Cardiovascular:  Negative for chest pain.   Gastrointestinal:  Negative for abdominal pain, constipation and diarrhea.   Endocrine: Negative for polydipsia and polyuria.   Genitourinary:  Negative for difficulty urinating and menstrual problem.   Integumentary:  Negative for rash.   Neurological:  Negative for dizziness.   Hematological:  Does not bruise/bleed easily.        Lab Results   Component Value Date    HGBA1C 5.1 01/10/2025     No results found for: "MICALBCREAT"  Lab Results   Component Value Date    LDLCALC 91.0 01/10/2025    CHOL 180 01/10/2025    HDL 66 01/10/2025    TRIG 115 01/10/2025       Lab Results   Component Value Date     01/10/2025    K 3.9 01/10/2025     01/10/2025    CO2 19 (L) 01/10/2025    GLU 93 01/10/2025    BUN 17 01/10/2025    CREATININE 0.9 01/10/2025    CALCIUM 9.6 01/10/2025    PROT 7.9 01/10/2025    ALBUMIN 3.9 01/10/2025    BILITOT 0.3 01/10/2025    ALKPHOS 85 01/10/2025    AST 14 01/10/2025    ALT 17 01/10/2025    ANIONGAP 13 01/10/2025    WBC 7.85 01/10/2025    HGB 13.4 01/10/2025    HGB 13.3 08/03/2024    HCT 40.6 01/10/2025    MCV 88 01/10/2025     01/10/2025    HEPCAB Non-reactive 12/01/2023       No results found for: "LH", "FSH", "TOTALTESTOST", "PROGESTERONE", "ESTRADIOL", "[id number]", "[id number]", "FERRITIN", "IRON", "TRANSFERRIN", "TIBC", "FESATURATED", "ZINC"    Objective:   Physical Exam   Physical Exam    Vitals: Blood pressure is awesome.  General: No acute distress. Well-developed. Well-nourished.+obesity    Eyes: EOMI. Sclerae anicteric.  HENT: Normocephalic. Atraumatic. Nares patent. Moist oral mucosa.  Ears: Bilateral TMs clear. Bilateral EACs clear.  Cardiovascular: Regular rate. Regular rhythm. No murmurs. No rubs. No gallops. Normal S1, S2.  Respiratory: Normal respiratory effort. Clear to " auscultation bilaterally. No rales. No rhonchi. No wheezing.  Abdomen: Soft. Non-tender. Non-distended. Normoactive bowel sounds.  Musculoskeletal: No  obvious deformity.  Extremities: No lower extremity edema.  Neurological: Alert & oriented x3. No slurred speech. Normal gait.  Psychiatric: Normal mood. Normal affect. Good insight. Good judgment.  Skin: Warm. Dry. No rash.         Assessment:     1. Hyperventilation    2. Anxiety    3. Hypertension, unspecified type    4. Obesity (BMI 30.0-34.9)      Plan:   Assessment & Plan   Hyperventilation    Anxiety    Hypertension, unspecified type    Obesity (BMI 30.0-34.9)       Assessment & Plan    IMPRESSION:  - Assessed blood pressure: within acceptable range  - Reviewed recent blood test results: all values within normal limits  - Evaluated cholesterol levels: total cholesterol 180, triglycerides and HDL within optimal ranges, LDL at 90  - Confirmed absence of diabetes based on HbA1c of 5.1%  - Noted low chloride levels, potentially associated with anxiety  - Observed resolution of previous anemia    ELECTROLYTE IMBALANCE:  - Informed the patient about the relationship between low chloride levels and anxiety.  - Evaluated that the low potassium chloride level is common and may be related to anxiety during blood draw.  - Acknowledged the low potassium chloride level but did not express concern.  - Monitored the patient's potassium chloride level, which is low and associated with some types of anxieties.    HYPERLIPIDEMIA:  - Explained the significance of cholesterol components: total cholesterol, triglycerides, HDL, and LDL.  - Reviewed the patient's blood test results, including cholesterol, triglycerides, and blood sugar.  - Evaluated cholesterol levels: total cholesterol 180 (good), HDL 66 (good), LDL 90 (good).    DIABETES SCREENING:  - Discussed HbA1c as an indicator of average blood sugar over the past 2-3 months.  - Monitored the patient's HbA1c level, which is  5.1, indicating no diabetes.    ANEMIA:  - Noted that the patient's previous anemia has resolved.    KIDNEY AND LIVER FUNCTION:  - Assessed kidney and liver functions as normal.    BLOOD CELL COUNT:  - Evaluated blood cells: white cells, RBCs, and platelets are all normal.    WEIGHT MANAGEMENT:  - Advised the patient to exercise and lose weight.  - Patient to continue exercise regimen.  - Recommend working on weight loss.    FOLLOW UP:  - Recommend follow up in 1 year.  - Follow up in 1 year.  - Contact the office if any needs arise before next scheduled appointment.    OTHER INSTRUCTIONS:  - Assessed overall health status based on labs and acknowledged improvement in anemia.              Follow up in about 1 year (around 2/14/2026).     There are no Patient Instructions on file for this visit.  [unfilled]   All of your core healthy metrics are met.      Visit Checklist (as applicable):  1. Status of new and prior symptoms discussed? yes  2. Imaging reviewed/ ordered as appropriate? yes  3. Lab study reviewed/ ordered as appropriate? yes  4. Plan for work-up and treatment discussed with patient? yes  5. Potential medication side-effects and monitoring plan discussed? yes  6. Review of outside medical records was performed and pertinent details are summarized in the HPI above? yes     Time spent on this encounter: 25 minutes. This includes face to face time and non-face to face time preparing to see the patient (eg, review of tests), obtaining and/or reviewing separately obtained history, documenting clinical information in the electronic or other health record, independently interpreting results (not separately reported) and communicating results to the patient/family/caregiver, or care coordination (not separately reported). Also patient education regarding chronic and acute medical conditions reviewed. Patient handouts given if appropriate.     Each patient to whom he or she provides medical services by  telemedicine is:  (1) informed of the relationship between the physician and patient and the respective role of any other health care provider with respect to management of the patient; and (2) notified that he or she may decline to receive medical services by telemedicine and may withdraw from such care at any time.     This note was generated with the assistance of ambient listening technology. Verbal consent was obtained by the patient and accompanying visitor(s) for the recording of patient appointment to facilitate this note. I attest to having reviewed and edited the generated note for accuracy, though some syntax or spelling errors may persist. Please contact the author of this note for any clarification.       Andrae Thomas MD  [facility], [place]

## 2025-07-03 DIAGNOSIS — I10 CHRONIC HYPERTENSION: ICD-10-CM

## 2025-07-03 RX ORDER — LABETALOL 200 MG/1
400 TABLET, FILM COATED ORAL
Qty: 180 TABLET | Refills: 1 | Status: SHIPPED | OUTPATIENT
Start: 2025-07-03

## 2025-07-03 NOTE — TELEPHONE ENCOUNTER
Refill Decision Note   Arianna Abena  is requesting a refill authorization.  Brief Assessment and Rationale for Refill:  Approve     Medication Therapy Plan:         Comments:     Note composed:6:22 AM 07/03/2025